# Patient Record
Sex: FEMALE | Race: WHITE | NOT HISPANIC OR LATINO
[De-identification: names, ages, dates, MRNs, and addresses within clinical notes are randomized per-mention and may not be internally consistent; named-entity substitution may affect disease eponyms.]

---

## 2019-10-16 ENCOUNTER — APPOINTMENT (OUTPATIENT)
Dept: ENDOCRINOLOGY | Facility: CLINIC | Age: 64
End: 2019-10-16
Payer: COMMERCIAL

## 2019-10-16 VITALS
WEIGHT: 161 LBS | HEART RATE: 56 BPM | BODY MASS INDEX: 26.82 KG/M2 | OXYGEN SATURATION: 98 % | SYSTOLIC BLOOD PRESSURE: 110 MMHG | HEIGHT: 65 IN | DIASTOLIC BLOOD PRESSURE: 70 MMHG

## 2019-10-16 DIAGNOSIS — Z80.0 FAMILY HISTORY OF MALIGNANT NEOPLASM OF DIGESTIVE ORGANS: ICD-10-CM

## 2019-10-16 DIAGNOSIS — Z85.3 PERSONAL HISTORY OF MALIGNANT NEOPLASM OF BREAST: ICD-10-CM

## 2019-10-16 DIAGNOSIS — Z23 ENCOUNTER FOR IMMUNIZATION: ICD-10-CM

## 2019-10-16 DIAGNOSIS — Z80.8 FAMILY HISTORY OF MALIGNANT NEOPLASM OF OTHER ORGANS OR SYSTEMS: ICD-10-CM

## 2019-10-16 DIAGNOSIS — Z86.59 PERSONAL HISTORY OF OTHER MENTAL AND BEHAVIORAL DISORDERS: ICD-10-CM

## 2019-10-16 DIAGNOSIS — Z86.79 PERSONAL HISTORY OF OTHER DISEASES OF THE CIRCULATORY SYSTEM: ICD-10-CM

## 2019-10-16 PROCEDURE — 90674 CCIIV4 VAC NO PRSV 0.5 ML IM: CPT

## 2019-10-16 PROCEDURE — G0008: CPT

## 2019-10-16 PROCEDURE — 99245 OFF/OP CONSLTJ NEW/EST HI 55: CPT | Mod: 25

## 2019-10-16 RX ORDER — SERTRALINE HYDROCHLORIDE 50 MG/1
50 TABLET, FILM COATED ORAL
Refills: 0 | Status: ACTIVE | COMMUNITY

## 2019-10-16 RX ORDER — LISINOPRIL AND HYDROCHLOROTHIAZIDE TABLETS 20; 12.5 MG/1; MG/1
20-12.5 TABLET ORAL
Refills: 0 | Status: ACTIVE | COMMUNITY

## 2019-10-16 RX ORDER — METOPROLOL SUCCINATE 25 MG/1
25 TABLET, EXTENDED RELEASE ORAL
Refills: 0 | Status: ACTIVE | COMMUNITY

## 2019-10-18 LAB
25(OH)D3 SERPL-MCNC: 15.6 NG/ML
ACTH SER-ACNC: 9.6 PG/ML
ALBUMIN SERPL ELPH-MCNC: 4.8 G/DL
ALDOSTERONE SERUM: 15.9 NG/DL
ALP BLD-CCNC: 112 U/L
ALT SERPL-CCNC: 9 U/L
ANION GAP SERPL CALC-SCNC: 15 MMOL/L
AST SERPL-CCNC: 12 U/L
BASOPHILS # BLD AUTO: 0.05 K/UL
BASOPHILS NFR BLD AUTO: 0.5 %
BILIRUB SERPL-MCNC: 0.4 MG/DL
BUN SERPL-MCNC: 22 MG/DL
CALCIUM SERPL-MCNC: 10.2 MG/DL
CALCIUM SERPL-MCNC: 10.2 MG/DL
CHLORIDE SERPL-SCNC: 102 MMOL/L
CHOLEST SERPL-MCNC: 149 MG/DL
CHOLEST/HDLC SERPL: 2.9 RATIO
CO2 SERPL-SCNC: 23 MMOL/L
CORTIS SERPL-MCNC: 6.1 UG/DL
CREAT SERPL-MCNC: 0.67 MG/DL
EOSINOPHIL # BLD AUTO: 0.09 K/UL
EOSINOPHIL NFR BLD AUTO: 1 %
ESTIMATED AVERAGE GLUCOSE: 103 MG/DL
GLUCOSE SERPL-MCNC: 92 MG/DL
HBA1C MFR BLD HPLC: 5.2 %
HCT VFR BLD CALC: 38 %
HDLC SERPL-MCNC: 52 MG/DL
HGB BLD-MCNC: 11.8 G/DL
IMM GRANULOCYTES NFR BLD AUTO: 0.5 %
LDLC SERPL CALC-MCNC: 71 MG/DL
LYMPHOCYTES # BLD AUTO: 2.12 K/UL
LYMPHOCYTES NFR BLD AUTO: 22.5 %
MAGNESIUM SERPL-MCNC: 2.1 MG/DL
MAN DIFF?: NORMAL
MCHC RBC-ENTMCNC: 27.8 PG
MCHC RBC-ENTMCNC: 31.1 GM/DL
MCV RBC AUTO: 89.4 FL
MONOCYTES # BLD AUTO: 0.52 K/UL
MONOCYTES NFR BLD AUTO: 5.5 %
NEUTROPHILS # BLD AUTO: 6.58 K/UL
NEUTROPHILS NFR BLD AUTO: 70 %
PARATHYROID HORMONE INTACT: 40 PG/ML
PHOSPHATE SERPL-MCNC: 4 MG/DL
PLATELET # BLD AUTO: 296 K/UL
POTASSIUM SERPL-SCNC: 4.2 MMOL/L
PROT SERPL-MCNC: 7.1 G/DL
RBC # BLD: 4.25 M/UL
RBC # FLD: 13.3 %
SODIUM SERPL-SCNC: 140 MMOL/L
T4 FREE SERPL-MCNC: 1 NG/DL
TRIGL SERPL-MCNC: 128 MG/DL
TSH SERPL-ACNC: 3.12 UIU/ML
WBC # FLD AUTO: 9.41 K/UL

## 2019-10-23 LAB
ANDROST SERPL-MCNC: 24 NG/DL
METANEPHRINE, PL: 43 PG/ML
NORMETANEPHRINE, PL: 69 PG/ML
RENIN ACTIVITY, PLASMA: 0.31 NG/ML/HR
TESTOST BND SERPL-MCNC: 0.06 NG/DL
TESTOSTERONE BIOAVAILABLE: 0.6 NG/DL
TESTOSTERONE TOTAL S: 8 NG/DL

## 2019-10-24 LAB — DEXAMETHASONE LEVEL: NORMAL

## 2020-03-31 ENCOUNTER — RX RENEWAL (OUTPATIENT)
Age: 65
End: 2020-03-31

## 2020-03-31 RX ORDER — ERGOCALCIFEROL 1.25 MG/1
1.25 MG CAPSULE, LIQUID FILLED ORAL
Qty: 12 | Refills: 1 | Status: ACTIVE | COMMUNITY
Start: 2019-10-18 | End: 1900-01-01

## 2020-09-17 ENCOUNTER — RX RENEWAL (OUTPATIENT)
Age: 65
End: 2020-09-17

## 2020-10-14 ENCOUNTER — APPOINTMENT (OUTPATIENT)
Dept: ENDOCRINOLOGY | Facility: CLINIC | Age: 65
End: 2020-10-14
Payer: COMMERCIAL

## 2020-10-14 PROCEDURE — 99442: CPT

## 2021-10-06 ENCOUNTER — APPOINTMENT (OUTPATIENT)
Dept: ENDOCRINOLOGY | Facility: CLINIC | Age: 66
End: 2021-10-06

## 2021-10-06 ENCOUNTER — APPOINTMENT (OUTPATIENT)
Dept: ENDOCRINOLOGY | Facility: CLINIC | Age: 66
End: 2021-10-06
Payer: MEDICARE

## 2021-10-06 PROCEDURE — 99214 OFFICE O/P EST MOD 30 MIN: CPT | Mod: 95

## 2021-10-06 NOTE — PHYSICAL EXAM
[Alert] : alert [Well Nourished] : well nourished [Healthy Appearance] : healthy appearance [Normal Sclera/Conjunctiva] : normal sclera/conjunctiva [EOMI] : extra ocular movement intact [Normal Outer Ear/Nose] : the ears and nose were normal in appearance [Normal Hearing] : hearing was normal [No Respiratory Distress] : no respiratory distress [No Accessory Muscle Use] : no accessory muscle use [No Stigmata of Cushings Syndrome] : no stigmata of Cushings Syndrome [No Rash] : no rash [No Motor Deficits] : the motor exam was normal [No Tremors] : no tremors [Oriented x3] : oriented to person, place, and time [Normal Affect] : the affect was normal [Normal Mood] : the mood was normal

## 2021-10-06 NOTE — ASSESSMENT
[FreeTextEntry1] : 66F presents for consultation regarding recently discovered 5cm adrenal incidentaloma during imaging for colitis.\par \par 1) L adrenal adenoma\par Fat based mass consistent with benign myelolipoma\par stable is size from prior imaging\par Workup for adrenal hypersecretion unrevealing\par asymptomatic\par plan to repeat CT adrenal protocol June 2022\par \par 2) HTN - currently well controlled on 3  agents. HR mildly low, on beta blocker. Given suspicious for pheo is low can continue on beta blocker at this time.\par 3) Osteopenia - due for DXA will reorder\par counselled on 3 servings calcium daily, can include calcium supplement if not taking in enough in diet\par adherence to vit D supplement reviewed D3 1000 units daily\par 4) B12 deficiency\par resume use of OTC supplement\par repeat labs B12 and 25OHD with PCP\par \par Follow up June 2022 (RACHEAL ivan)

## 2021-10-06 NOTE — HISTORY OF PRESENT ILLNESS
[FreeTextEntry1] : 66F presents for telehealth follow up regarding L adrenal mass, osteopenia, HTN.\par \par Had CT adrenal protocol June 2020 (unable to tolerate MRI)\par L adrenal fat based mass consistent with benign myelolipoma around 5cm, stable from prior imaging.\par Weight stable, no new symptoms other than insomnia which she attributes to stress and [facility]/change in schedule.\par \par History of osteopenia, last DXA 10/2019\par Calcium intake - eats yogurt daily, consumes about 2-3 servings of dairy per day. Not taking calcium supplement. \par \par Prior scans:\par \par CT A/P WO cont 8/2018:\par fat attenuation mass in the L adrenal gland 5.1 x 4.9 x 4.4 cm, slightly larger than on prior (7/2017) consistent with myolipoma.\par \par CT A/P with IV contrast 9/13/19:\par 5cm heterogenous fat attenuation mass left adrenal. Likely adrenal adenoma.\par Also 8mm low attenutation lesion on spleen, likely cyst. And findings c/w colitis

## 2021-10-06 NOTE — REVIEW OF SYSTEMS
[Recent Weight Gain (___ Lbs)] : no recent weight gain [Recent Weight Loss (___ Lbs)] : no recent weight loss [Negative] : Heme/Lymph

## 2021-10-06 NOTE — REASON FOR VISIT
[Home] : at home, [unfilled] , at the time of the visit. [Medical Office: ([facility])___] : at the medical office located in  [Verbal consent obtained from patient] : the patient, [unfilled] [Follow - Up] : a follow-up visit [Adrenal Evaluation/Adrenal Disorder] : adrenal evaluation/adrenal disorder

## 2021-10-22 RX ORDER — DEXAMETHASONE 1 MG/1
1 TABLET ORAL
Qty: 1 | Refills: 0 | Status: DISCONTINUED | COMMUNITY
Start: 2019-10-16 | End: 2021-10-22

## 2022-06-15 ENCOUNTER — APPOINTMENT (OUTPATIENT)
Dept: ENDOCRINOLOGY | Facility: CLINIC | Age: 67
End: 2022-06-15
Payer: MEDICARE

## 2022-06-15 PROCEDURE — 99214 OFFICE O/P EST MOD 30 MIN: CPT | Mod: 95

## 2022-06-15 NOTE — REASON FOR VISIT
[Follow - Up] : a follow-up visit [Adrenal Evaluation/Adrenal Disorder] : adrenal evaluation/adrenal disorder [Home] : at home, [unfilled] , at the time of the visit. [Medical Office: ([facility])___] : at the medical office located in  [Verbal consent obtained from patient] : the patient, [unfilled]

## 2022-06-15 NOTE — HISTORY OF PRESENT ILLNESS
[FreeTextEntry1] : 67F presents for telehealth follow up regarding L adrenal mass, osteopenia, HTN.\par \par Had CT adrenal protocol June 2020 (unable to tolerate MRI)\par L adrenal fat based mass consistent with benign myelolipoma around 5cm, stable from prior imaging.\par Weight stable, no new symptoms other than insomnia which she attributes to stress and snf/change in schedule.\par \par History of osteopenia on DXA 10/2019\par Follow up DXA done 10/21:\par L Spine T score - 1.2\par Fem neck -1.9\par Hip -1.4\par \par Calcium intake - eats yogurt daily, consumes about 2-3 servings of dairy per day. Not taking calcium supplement. Not consistently taking vitamin D. No fractures. \par \par Prior scans:\par \par CT A/P WO cont 8/2018:\par fat attenuation mass in the L adrenal gland 5.1 x 4.9 x 4.4 cm, slightly larger than on prior (7/2017) consistent with myolipoma.\par \par CT A/P with IV contrast 9/13/19:\par 5cm heterogenous fat attenuation mass left adrenal. Likely adrenal adenoma.\par Also 8mm low attenutation lesion on spleen, likely cyst. And findings c/w colitis\par \par HTN no change to meds, not routinely checking BP at home, no recent doctor appointments.

## 2022-06-15 NOTE — ASSESSMENT
[FreeTextEntry1] : 67F presents for follow up regarding 5cm adrenal incidentaloma, osteopenia, HTN.\par \par 1) L adrenal adenoma\par Fat based mass consistent with benign myelolipoma\par stable is size from prior imaging\par Workup for adrenal hypersecretion unrevealing\par asymptomatic\par plan to repeat CT adrenal protocol June 2022, will order. Did not tolerate MRI.\par \par 2) HTN - remains on 3  agents. Has not checked BP recently, recommend periodic home monitoring.\par 3) Osteopenia - Next DXA due 10/2023\par counselled on 3 servings calcium daily, can include calcium supplement if not taking in enough in diet\par adherence to vit D supplement reviewed D3 1000 units daily\par 4) B12 deficiency\par resume use of OTC supplement\par repeat labs B12 and 25OHD \par \par Follow up one year, RACHEAL ivan

## 2022-07-07 ENCOUNTER — NON-APPOINTMENT (OUTPATIENT)
Age: 67
End: 2022-07-07

## 2022-07-14 ENCOUNTER — APPOINTMENT (OUTPATIENT)
Dept: UROLOGY | Facility: CLINIC | Age: 67
End: 2022-07-14

## 2022-07-14 VITALS
TEMPERATURE: 98.9 F | BODY MASS INDEX: 27.16 KG/M2 | HEIGHT: 65 IN | SYSTOLIC BLOOD PRESSURE: 130 MMHG | HEART RATE: 60 BPM | RESPIRATION RATE: 17 BRPM | DIASTOLIC BLOOD PRESSURE: 75 MMHG | WEIGHT: 163 LBS

## 2022-07-14 DIAGNOSIS — Z86.2 PERSONAL HISTORY OF DISEASES OF THE BLOOD AND BLOOD-FORMING ORGANS AND CERTAIN DISORDERS INVOLVING THE IMMUNE MECHANISM: ICD-10-CM

## 2022-07-14 PROCEDURE — 99215 OFFICE O/P EST HI 40 MIN: CPT

## 2022-07-14 NOTE — PHYSICAL EXAM
[General Appearance - Well Developed] : well developed [Normal Appearance] : normal appearance [Edema] : no peripheral edema [] : no respiratory distress [Exaggerated Use Of Accessory Muscles For Inspiration] : no accessory muscle use [Abdomen Soft] : soft [Costovertebral Angle Tenderness] : no ~M costovertebral angle tenderness [Normal Station and Gait] : the gait and station were normal for the patient's age [Skin Lesions] : no skin lesions [No Focal Deficits] : no focal deficits [Oriented To Time, Place, And Person] : oriented to person, place, and time

## 2022-07-14 NOTE — ASSESSMENT
[FreeTextEntry1] : 67F with asymptomatic adrenal myelolipoma, enlarging on recent imaging.\par \par - Discussed IR embolization of the mass \par \par - Message sent to Dr Bajwa to schedule for consultation\par   I spent over 60 -minutes time today on all issues related to this encounter on today date of service including non face to face time.\par .\par

## 2022-07-14 NOTE — HISTORY OF PRESENT ILLNESS
[None] : no symptoms [FreeTextEntry1] : 67F with history of known left adrenal myelolipoma, known since 2009, slowly growing since then.\par Followed by endocrinology, negative hormonal workup.\par Referred for recent enlargement.\par \par Fatigue recently, past 2-3 weeks, busy with son's wedding.\par History of micro hematuria ? 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

## 2022-07-27 ENCOUNTER — APPOINTMENT (OUTPATIENT)
Dept: INTERVENTIONAL RADIOLOGY/VASCULAR | Facility: CLINIC | Age: 67
End: 2022-07-27

## 2022-08-10 ENCOUNTER — APPOINTMENT (OUTPATIENT)
Dept: INTERVENTIONAL RADIOLOGY/VASCULAR | Facility: CLINIC | Age: 67
End: 2022-08-10

## 2022-08-10 VITALS
WEIGHT: 161 LBS | HEIGHT: 65 IN | BODY MASS INDEX: 26.82 KG/M2 | OXYGEN SATURATION: 98 % | SYSTOLIC BLOOD PRESSURE: 131 MMHG | DIASTOLIC BLOOD PRESSURE: 71 MMHG | RESPIRATION RATE: 17 BRPM | HEART RATE: 51 BPM

## 2022-08-10 DIAGNOSIS — D69.1 QUALITATIVE PLATELET DEFECTS: ICD-10-CM

## 2022-08-10 PROCEDURE — 99204 OFFICE O/P NEW MOD 45 MIN: CPT

## 2022-08-10 RX ORDER — NORMAL SALT TABLETS 1 G/G
1 TABLET ORAL
Qty: 18 | Refills: 0 | Status: DISCONTINUED | COMMUNITY
Start: 2019-10-24 | End: 2022-08-10

## 2022-08-10 NOTE — HISTORY OF PRESENT ILLNESS
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 Kim - [place] [phone]). \par Left Adrenal myelolipoma (Dx 2009) which has increased in size since last CT scan (doesn't tolerate MRI). \par \par Patient is now being referred by Dr. Burciaga for consultation to discuss embolization of left adrenal myelolipoma.\par \par \par Denies any recent SOB, CP, fever, chills, n/v/d. \par \par Patient sates she has been feeling well overall. Appetite has been good no unintentional weight loss.\par \par Patient aware to get hematology clearance prior to procedure with recommendation for DDAVP infusion. \par \par Patient to be evaluated by endocrinologist as well prior to embolization.

## 2022-08-10 NOTE — CONSULT LETTER
[Dear  ___] : Dear  [unfilled], [Consult Letter:] : I had the pleasure of evaluating your patient, [unfilled]. [Please see my note below.] : Please see my note below. [Consult Closing:] : Thank you very much for allowing me to participate in the care of this patient.  If you have any questions, please do not hesitate to contact me. [Sincerely,] : Sincerely, [FreeTextEntry2] : Dr. Jeremias Burciaga

## 2022-08-10 NOTE — ASSESSMENT
[FreeTextEntry1] : 67-year-old female with known left adrenal myelolipoma, known since 2009, progressively increasing in size since then.  CT dated 7/5/2022 demonstrated 6 x 5.4 x 6.3 cm left adrenal mass consistent with myelolipoma.  The mass was measuring 5.5 x 4.7 x 5.6 cm on January 23, 2020 and 2.6 x 2.3 cm in 2009.\par \par Comorbidities:\par Coxsackie virus complicated with pericardial effusion in 2012.  Hypertension, osteopenia.  Breast cancer in 2009 status post bilateral mastectomies followed with radiation therapy.  Status post hysterectomy in 1986 during which patient had a massive hemorrhage.  Hepatitis C following massive blood transfusions.  Patient was diagnosed with qualitative platelet disorder which requires DDAVP transfusion before any invasive procedures.  Patient's hematologist is  from [facility].\par \par Assessment:\par Considering progressive enlargement of the left adrenal myelolipoma reaching over 6 cm in size, patient's blood disorder making her increased risk for massive hemorrhage due to tumor rupture, patient is an appropriate candidate for left adrenal myelo lipoma embolization in order to avoid possible life-threatening hemorrhage.\par \par Procedure, its risks, benefits and alternatives were discussed with the patient, her  and informed consent was obtained.\par \par Plan:\par 1.  Schedule left adrenal angiogram and embolization.\par 2.  Vascular access likely right common femoral artery.\par 3.  Perform CT angiogram to complete evaluation of the left adrenal mass and create possible vascular mapping.\par 4.  Hematology consult for perioperative management.\par 5.  Endocrinology consult regarding perioperative management.\par 6.  Patient will be admitted to the hospital postprocedure for possible postembolization syndrome management on Dr. Burciaga's service.

## 2022-08-10 NOTE — PHYSICAL EXAM
[Alert] : alert [No Respiratory Distress] : no respiratory distress [No Accessory Muscle Use] : no accessory muscle use [Clear to Auscultation] : lungs were clear to auscultation bilaterally [Normal Rate] : heart rate was normal  [Oriented x3] : oriented to person, place, and time [de-identified] : b/l femoral pulses palpable and b/l DP's palpable as well. Barbeau test Right Type: A Left Type:A [de-identified] : b/l groin C/D/I no erythema or s/s of infection noted.

## 2022-08-16 ENCOUNTER — APPOINTMENT (OUTPATIENT)
Dept: CT IMAGING | Facility: IMAGING CENTER | Age: 67
End: 2022-08-16

## 2022-08-16 ENCOUNTER — OUTPATIENT (OUTPATIENT)
Dept: OUTPATIENT SERVICES | Facility: HOSPITAL | Age: 67
LOS: 1 days | End: 2022-08-16

## 2022-08-16 VITALS
TEMPERATURE: 98 F | OXYGEN SATURATION: 98 % | WEIGHT: 158.07 LBS | HEIGHT: 65 IN | SYSTOLIC BLOOD PRESSURE: 136 MMHG | RESPIRATION RATE: 16 BRPM | DIASTOLIC BLOOD PRESSURE: 70 MMHG | HEART RATE: 80 BPM

## 2022-08-16 DIAGNOSIS — Z90.710 ACQUIRED ABSENCE OF BOTH CERVIX AND UTERUS: Chronic | ICD-10-CM

## 2022-08-16 DIAGNOSIS — Z98.891 HISTORY OF UTERINE SCAR FROM PREVIOUS SURGERY: Chronic | ICD-10-CM

## 2022-08-16 DIAGNOSIS — C50.919 MALIGNANT NEOPLASM OF UNSPECIFIED SITE OF UNSPECIFIED FEMALE BREAST: Chronic | ICD-10-CM

## 2022-08-16 DIAGNOSIS — D69.1 QUALITATIVE PLATELET DEFECTS: ICD-10-CM

## 2022-08-16 DIAGNOSIS — N32.9 BLADDER DISORDER, UNSPECIFIED: Chronic | ICD-10-CM

## 2022-08-16 DIAGNOSIS — D35.02 BENIGN NEOPLASM OF LEFT ADRENAL GLAND: ICD-10-CM

## 2022-08-16 DIAGNOSIS — D17.79 BENIGN LIPOMATOUS NEOPLASM OF OTHER SITES: ICD-10-CM

## 2022-08-16 DIAGNOSIS — I10 ESSENTIAL (PRIMARY) HYPERTENSION: ICD-10-CM

## 2022-08-16 LAB
ALBUMIN SERPL ELPH-MCNC: 4.9 G/DL — SIGNIFICANT CHANGE UP (ref 3.3–5)
ALP SERPL-CCNC: 116 U/L — SIGNIFICANT CHANGE UP (ref 40–120)
ALT FLD-CCNC: 11 U/L — SIGNIFICANT CHANGE UP (ref 4–33)
ANION GAP SERPL CALC-SCNC: 12 MMOL/L — SIGNIFICANT CHANGE UP (ref 7–14)
APTT BLD: 35 SEC — SIGNIFICANT CHANGE UP (ref 27–36.3)
AST SERPL-CCNC: 17 U/L — SIGNIFICANT CHANGE UP (ref 4–32)
BILIRUB SERPL-MCNC: 0.5 MG/DL — SIGNIFICANT CHANGE UP (ref 0.2–1.2)
BLD GP AB SCN SERPL QL: NEGATIVE — SIGNIFICANT CHANGE UP
BUN SERPL-MCNC: 16 MG/DL — SIGNIFICANT CHANGE UP (ref 7–23)
CALCIUM SERPL-MCNC: 9.8 MG/DL — SIGNIFICANT CHANGE UP (ref 8.4–10.5)
CHLORIDE SERPL-SCNC: 101 MMOL/L — SIGNIFICANT CHANGE UP (ref 98–107)
CO2 SERPL-SCNC: 27 MMOL/L — SIGNIFICANT CHANGE UP (ref 22–31)
CREAT SERPL-MCNC: 0.59 MG/DL — SIGNIFICANT CHANGE UP (ref 0.5–1.3)
EGFR: 99 ML/MIN/1.73M2 — SIGNIFICANT CHANGE UP
GLUCOSE SERPL-MCNC: 75 MG/DL — SIGNIFICANT CHANGE UP (ref 70–99)
HCT VFR BLD CALC: 37.2 % — SIGNIFICANT CHANGE UP (ref 34.5–45)
HGB BLD-MCNC: 11.9 G/DL — SIGNIFICANT CHANGE UP (ref 11.5–15.5)
INR BLD: 0.98 RATIO — SIGNIFICANT CHANGE UP (ref 0.88–1.16)
MCHC RBC-ENTMCNC: 27.5 PG — SIGNIFICANT CHANGE UP (ref 27–34)
MCHC RBC-ENTMCNC: 32 GM/DL — SIGNIFICANT CHANGE UP (ref 32–36)
MCV RBC AUTO: 86.1 FL — SIGNIFICANT CHANGE UP (ref 80–100)
NRBC # BLD: 0 /100 WBCS — SIGNIFICANT CHANGE UP (ref 0–0)
NRBC # FLD: 0 K/UL — SIGNIFICANT CHANGE UP (ref 0–0)
PLATELET # BLD AUTO: 262 K/UL — SIGNIFICANT CHANGE UP (ref 150–400)
POTASSIUM SERPL-MCNC: 3.6 MMOL/L — SIGNIFICANT CHANGE UP (ref 3.5–5.3)
POTASSIUM SERPL-SCNC: 3.6 MMOL/L — SIGNIFICANT CHANGE UP (ref 3.5–5.3)
PROT SERPL-MCNC: 7.4 G/DL — SIGNIFICANT CHANGE UP (ref 6–8.3)
PROTHROM AB SERPL-ACNC: 11.4 SEC — SIGNIFICANT CHANGE UP (ref 10.5–13.4)
RBC # BLD: 4.32 M/UL — SIGNIFICANT CHANGE UP (ref 3.8–5.2)
RBC # FLD: 13.3 % — SIGNIFICANT CHANGE UP (ref 10.3–14.5)
RH IG SCN BLD-IMP: POSITIVE — SIGNIFICANT CHANGE UP
SODIUM SERPL-SCNC: 140 MMOL/L — SIGNIFICANT CHANGE UP (ref 135–145)
WBC # BLD: 8.67 K/UL — SIGNIFICANT CHANGE UP (ref 3.8–10.5)
WBC # FLD AUTO: 8.67 K/UL — SIGNIFICANT CHANGE UP (ref 3.8–10.5)

## 2022-08-16 PROCEDURE — 93010 ELECTROCARDIOGRAM REPORT: CPT

## 2022-08-16 NOTE — H&P PST ADULT - LAB RESULTS AND INTERPRETATION
cbc, cmp, PT/PTT/INR , T&S cbc, cmp, PT/PTT/INR ,   T&S done for hx of hemorrhage in past r/t platelet disorder

## 2022-08-16 NOTE — H&P PST ADULT - NSANTHOSAYNRD_GEN_A_CORE
No. NAMAN screening performed.  STOP BANG Legend: 0-2 = LOW Risk; 3-4 = INTERMEDIATE Risk; 5-8 = HIGH Risk

## 2022-08-16 NOTE — H&P PST ADULT - NSICDXPASTMEDICALHX_GEN_ALL_CORE_FT
PAST MEDICAL HISTORY:  Adrenal myelolipoma     Anxiety     Breast cancer left and right breast - NO IV/BP left arm    History of hepatitis C     HTN (hypertension)     Qualitative platelet disorder      PAST MEDICAL HISTORY:  Adrenal myelolipoma     Anxiety     Breast cancer left and right breast - NO IV/BP left arm    History of hepatitis C     HTN (hypertension)     Obesity     Qualitative platelet disorder

## 2022-08-16 NOTE — H&P PST ADULT - PROBLEM SELECTOR PLAN 1
Pt scheduled for surgery and preop instructions including instructions for taking Famotidine and for Chlorhexidine use in showering on the day of surgery, given verbally and with use of  written materials, and patient confirming understanding of such instructions using  teach back method.  Email to surgeon confirming Hematology clearance and need for preop infusion instructions  Request Echo, comp EKG and LVN from Cardio - reviewed with Dr Latif

## 2022-08-16 NOTE — H&P PST ADULT - GENITOURINARY COMMENTS
not examined hx Left Adrenal Myelolipoma - followed since 2009 and now increased in size - pt denies flank pain w/u done by Endo

## 2022-08-16 NOTE — H&P PST ADULT - PROBLEM SELECTOR PLAN 3
Pt to get Hematology clearance - has seen recently - and will confirm with surgeon preop DDAVP infusion instructions

## 2022-08-16 NOTE — H&P PST ADULT - HISTORY OF PRESENT ILLNESS
Pt is a   Pt given information for COVID PCR testing sites and told to make appointment 3-5 days preop  Pt is a 67 yr old female scheduled for Adrenal Myelolipoma Embolization with Dr Bajwa tentatively 8/29/22 - pt has hx of adrenal myelolipoma followed since 2009 and noted to have increased in size recently - pt hx qualitative platelet disorder with hemorrhage requiring 26u PC 1986 following surgery - pt seen by Hematology who will discuss preop instructions for DDAVP infusion with surgeon - pt hx Hep C treated yrs ago and Breast ca with b/l lumpectomies and RT - No IV/BP left arm - pt followed by Endo - ( Dr Burkett is daughter in law)   Pt given information for COVID PCR testing sites and told to make appointment 3-5 days preop

## 2022-08-16 NOTE — H&P PST ADULT - HEMATOLOGY/LYMPHATICS COMMENTS
hx qualitative platelet disorder - followed by Hematology - to speak to surgeon and arrange preop infusion - Hx Hep C - treated yrs ago

## 2022-08-16 NOTE — H&P PST ADULT - NSICDXPASTSURGICALHX_GEN_ALL_CORE_FT
PAST SURGICAL HISTORY:  Bladder disorder placenta attached    Breast cancer     H/O:      H/O: hysterectomy pregnancy related - hemorrhage     PAST SURGICAL HISTORY:  Bladder disorder placenta attached - surgery to remove    H/O:      H/O: hysterectomy pregnancy related - hemorrhage -

## 2022-08-16 NOTE — H&P PST ADULT - ENDOCRINE
Caller: Kailyn Eng    details…

## 2022-08-16 NOTE — H&P PST ADULT - ENERGY EXPENDITURE (METS)
----- Message from MOHAMUD Zheng sent at 3/18/2020  9:12 AM CDT -----  Notify patient-has upcoming appointment  With Dr. Berger.  CBC      Marginal outliers, stable discuss at f/u            Lipid panel  Elevated, LDL as noted, discuss at f/u  CMP  Stable with continued abnormal creatinine, check bmp in 6 months, discuss at f/u  Vitamin D level  Insufficient, take 800 IU Vitamin D3 daily OTC any brand and repeat level in 3-6 months, discuss at f/u  TSH  Normal  Schedule visit according to organizational recommendations.   >4

## 2022-08-25 ENCOUNTER — NON-APPOINTMENT (OUTPATIENT)
Age: 67
End: 2022-08-25

## 2022-08-25 PROBLEM — I10 ESSENTIAL (PRIMARY) HYPERTENSION: Chronic | Status: ACTIVE | Noted: 2022-08-16

## 2022-08-25 PROBLEM — D17.79 BENIGN LIPOMATOUS NEOPLASM OF OTHER SITES: Chronic | Status: ACTIVE | Noted: 2022-08-16

## 2022-08-25 PROBLEM — D69.1 QUALITATIVE PLATELET DEFECTS: Chronic | Status: ACTIVE | Noted: 2022-08-16

## 2022-08-25 PROBLEM — Z86.19 PERSONAL HISTORY OF OTHER INFECTIOUS AND PARASITIC DISEASES: Chronic | Status: ACTIVE | Noted: 2022-08-16

## 2022-08-25 PROBLEM — E66.9 OBESITY, UNSPECIFIED: Chronic | Status: ACTIVE | Noted: 2022-08-16

## 2022-08-25 PROBLEM — F41.9 ANXIETY DISORDER, UNSPECIFIED: Chronic | Status: ACTIVE | Noted: 2022-08-16

## 2022-08-25 PROBLEM — C50.919 MALIGNANT NEOPLASM OF UNSPECIFIED SITE OF UNSPECIFIED FEMALE BREAST: Chronic | Status: ACTIVE | Noted: 2022-08-16

## 2022-08-29 ENCOUNTER — OUTPATIENT (OUTPATIENT)
Dept: OUTPATIENT SERVICES | Facility: HOSPITAL | Age: 67
LOS: 1 days | End: 2022-08-29

## 2022-08-29 ENCOUNTER — INPATIENT (INPATIENT)
Facility: HOSPITAL | Age: 67
LOS: 0 days | Discharge: HOME | End: 2022-08-30
Attending: STUDENT IN AN ORGANIZED HEALTH CARE EDUCATION/TRAINING PROGRAM | Admitting: STUDENT IN AN ORGANIZED HEALTH CARE EDUCATION/TRAINING PROGRAM

## 2022-08-29 VITALS
SYSTOLIC BLOOD PRESSURE: 130 MMHG | HEART RATE: 51 BPM | RESPIRATION RATE: 14 BRPM | OXYGEN SATURATION: 94 % | TEMPERATURE: 98 F | DIASTOLIC BLOOD PRESSURE: 60 MMHG

## 2022-08-29 DIAGNOSIS — N32.9 BLADDER DISORDER, UNSPECIFIED: Chronic | ICD-10-CM

## 2022-08-29 DIAGNOSIS — M85.80 OTHER SPECIFIED DISORDERS OF BONE DENSITY AND STRUCTURE, UNSPECIFIED SITE: ICD-10-CM

## 2022-08-29 DIAGNOSIS — D35.02 BENIGN NEOPLASM OF LEFT ADRENAL GLAND: ICD-10-CM

## 2022-08-29 DIAGNOSIS — Z98.891 HISTORY OF UTERINE SCAR FROM PREVIOUS SURGERY: Chronic | ICD-10-CM

## 2022-08-29 DIAGNOSIS — I10 ESSENTIAL (PRIMARY) HYPERTENSION: ICD-10-CM

## 2022-08-29 DIAGNOSIS — Z90.710 ACQUIRED ABSENCE OF BOTH CERVIX AND UTERUS: Chronic | ICD-10-CM

## 2022-08-29 DIAGNOSIS — D17.79 BENIGN LIPOMATOUS NEOPLASM OF OTHER SITES: ICD-10-CM

## 2022-08-29 PROCEDURE — 99222 1ST HOSP IP/OBS MODERATE 55: CPT | Mod: GC

## 2022-08-29 PROCEDURE — 37243 VASC EMBOLIZE/OCCLUDE ORGAN: CPT

## 2022-08-29 PROCEDURE — 36245 INS CATH ABD/L-EXT ART 1ST: CPT

## 2022-08-29 PROCEDURE — 99231 SBSQ HOSP IP/OBS SF/LOW 25: CPT

## 2022-08-29 PROCEDURE — 76937 US GUIDE VASCULAR ACCESS: CPT | Mod: 26

## 2022-08-29 RX ORDER — CHOLECALCIFEROL (VITAMIN D3) 125 MCG
1000 CAPSULE ORAL DAILY
Refills: 0 | Status: DISCONTINUED | OUTPATIENT
Start: 2022-08-29 | End: 2022-08-30

## 2022-08-29 RX ORDER — METOPROLOL TARTRATE 50 MG
50 TABLET ORAL
Refills: 0 | Status: DISCONTINUED | OUTPATIENT
Start: 2022-08-29 | End: 2022-08-30

## 2022-08-29 RX ORDER — HYDROMORPHONE HYDROCHLORIDE 2 MG/ML
0.5 INJECTION INTRAMUSCULAR; INTRAVENOUS; SUBCUTANEOUS
Refills: 0 | Status: DISCONTINUED | OUTPATIENT
Start: 2022-08-29 | End: 2022-08-30

## 2022-08-29 RX ORDER — HYDROMORPHONE HYDROCHLORIDE 2 MG/ML
30 INJECTION INTRAMUSCULAR; INTRAVENOUS; SUBCUTANEOUS
Refills: 0 | Status: DISCONTINUED | OUTPATIENT
Start: 2022-08-29 | End: 2022-08-30

## 2022-08-29 RX ORDER — LANOLIN ALCOHOL/MO/W.PET/CERES
3 CREAM (GRAM) TOPICAL ONCE
Refills: 0 | Status: COMPLETED | OUTPATIENT
Start: 2022-08-29 | End: 2022-08-29

## 2022-08-29 RX ORDER — NALOXONE HYDROCHLORIDE 4 MG/.1ML
0.1 SPRAY NASAL
Refills: 0 | Status: DISCONTINUED | OUTPATIENT
Start: 2022-08-29 | End: 2022-08-30

## 2022-08-29 RX ORDER — DIPHENHYDRAMINE HCL 50 MG
25 CAPSULE ORAL EVERY 4 HOURS
Refills: 0 | Status: DISCONTINUED | OUTPATIENT
Start: 2022-08-29 | End: 2022-08-30

## 2022-08-29 RX ORDER — SODIUM CHLORIDE 9 MG/ML
1000 INJECTION INTRAMUSCULAR; INTRAVENOUS; SUBCUTANEOUS
Refills: 0 | Status: DISCONTINUED | OUTPATIENT
Start: 2022-08-29 | End: 2022-08-30

## 2022-08-29 RX ORDER — ONDANSETRON 8 MG/1
4 TABLET, FILM COATED ORAL EVERY 6 HOURS
Refills: 0 | Status: DISCONTINUED | OUTPATIENT
Start: 2022-08-29 | End: 2022-08-30

## 2022-08-29 RX ADMIN — Medication 3 MILLIGRAM(S): at 21:56

## 2022-08-29 RX ADMIN — SODIUM CHLORIDE 75 MILLILITER(S): 9 INJECTION INTRAMUSCULAR; INTRAVENOUS; SUBCUTANEOUS at 22:00

## 2022-08-29 RX ADMIN — HYDROMORPHONE HYDROCHLORIDE 30 MILLILITER(S): 2 INJECTION INTRAMUSCULAR; INTRAVENOUS; SUBCUTANEOUS at 16:52

## 2022-08-29 RX ADMIN — Medication 25 MILLIGRAM(S): at 20:05

## 2022-08-29 RX ADMIN — HYDROMORPHONE HYDROCHLORIDE 30 MILLILITER(S): 2 INJECTION INTRAMUSCULAR; INTRAVENOUS; SUBCUTANEOUS at 20:32

## 2022-08-29 NOTE — CONSULT NOTE ADULT - SUBJECTIVE AND OBJECTIVE BOX
ENDOCRINE INITIAL CONSULT -     HPI:      ENDOCRINE HISTORY     PAST MEDICAL & SURGICAL HISTORY:  HTN (hypertension)      Adrenal myelolipoma      Anxiety      History of hepatitis C      Qualitative platelet disorder      Breast cancer  left and right breast - NO IV/BP left arm      Obesity      H/O:       H/O: hysterectomy  pregnancy related - hemorrhage -       Bladder disorder  placenta attached - surgery to remove          FAMILY HISTORY:      Social History:      Home Medications:  lisinopril-hydrochlorothiazide 20 mg-12.5 mg oral tablet: 1 tab(s) orally once a day (16 Aug 2022 11:13)  maxi D3: OTC supplement - 1000 iu dailly  (16 Aug 2022 11:13)  metoprolol succinate 50 mg oral capsule, extended release: 1 cap(s) orally BID (16 Aug 2022 11:13)  sertraline 50 mg oral tablet: 1 tab(s) orally once a day (16 Aug 2022 11:13)      MEDICATIONS  (STANDING):    MEDICATIONS  (PRN):      Allergies    penicillin (Rash)  sulfa drugs (Hives)    Intolerances        REVIEW OF SYSTEMS  Constitutional: No fever  Eyes: No blurry vision  Neuro: No tremors  HEENT: No pain  Cardiovascular: No chest pain, palpitations  Respiratory: No SOB, no cough  GI: No nausea, vomiting, abdominal pain  : No dysuria  Skin: no rash  Psych: no depression  Endocrine: no polyuria, polydipsia  Hem/lymph: no swelling  Osteoporosis: no fractures  ALL OTHER SYSTEMS REVIEWED AND NEGATIVE     UNABLE TO OBTAIN     PHYSICAL EXAM   Vital Signs Last 24 Hrs  T(C): --  T(F): --  HR: --  BP: --  BP(mean): --  RR: --  SpO2: --      GENERAL: NAD, well-groomed, well-developed  EYES: No proptosis, no lid lag, anicteric  HEENT:  Atraumatic, Normocephalic, moist mucous membranes  THYROID: Normal size, no palpable nodules  RESPIRATORY: Clear to auscultation bilaterally; No rales, rhonchi, wheezing  CARDIOVASCULAR: Regular rate and rhythm; No murmurs; no peripheral edema  GI: Soft, nontender, non distended, normal bowel sounds  SKIN: Dry, intact, No rashes or lesions  MUSCULOSKELETAL: Full range of motion, normal strength  NEURO: sensation intact, extraocular movements intact, no tremor  PSYCH: Alert and oriented x 3, normal affect, normal mood  CUSHING'S SIGNS: no striae    CAPILLARY BLOOD GLUCOSE                              LIPIDS    RADIOLOGY ENDOCRINE INITIAL CONSULT - Adrenal Myelolipoma s/p IR embolization     HPI per PST.  67 yr old female scheduled for Adrenal Myelolipoma Embolization with Dr Bajwa tentatively 22 - pt has hx of adrenal myelolipoma followed since  and noted to have increased in size recently - pt hx qualitative platelet disorder with hemorrhage requiring 26u PC  following surgery - pt seen by Hematology who will discuss preop instructions for DDAVP infusion with surgeon - pt hx Hep C treated yrs ago and Breast ca with b/l lumpectomies and RT - No IV/BP left arm - pt followed by Endo - ( Dr Castillo-Mauricio is daughter in law)   Pt given information for COVID PCR testing sites and told to make appointment 3-5 days preop    ENDOCRINE HISTORY   Patient was incidentally found to have a L. adrenal mass ~ , has been asymptomatic. Hormonal hypersecretion work up was done & negative in the past.  She has been following with endocrinology, Dr. Castillo.  L adrenal mass was being monitored with routine CT A/P imaging, imaging consistent with benign adrenal myelolipoma (unable to tolerate MRI) & had previously been stable in size. On recent CT A/P done in recent increase in size to 6.3-6.5cm from prior 5.6cm. Patient was evaluated by urology & IR and presented earlier today for IR guided embolization. Seen post-op in IR recovery suite, no post-op complications & patient doing well per discussion with spouse at bedside. Patient is seen sleeping, had received some pain medication earlier. No post op bleeding complications.     Patient also has a hx of osteopenia, on vitamin D3 & dietary calcium supplementation, and HTN, on Lisinopril-HCTZ 20-12.5mg daily & Metoprolol succinate 25mg daily.     PAST MEDICAL & SURGICAL HISTORY:  HTN (hypertension)      Adrenal myelolipoma      Anxiety      History of hepatitis C      Qualitative platelet disorder      Breast cancer  left and right breast - NO IV/BP left arm      Obesity      H/O:       H/O: hysterectomy  pregnancy related - hemorrhage -       Bladder disorder  placenta attached - surgery to remove          FAMILY HISTORY:      Social History:      Home Medications:  lisinopril-hydrochlorothiazide 20 mg-12.5 mg oral tablet: 1 tab(s) orally once a day (16 Aug 2022 11:13)  maxi D3: OTC supplement - 1000 iu dailly  (16 Aug 2022 11:13)  metoprolol succinate 50 mg oral capsule, extended release: 1 cap(s) orally BID (16 Aug 2022 11:13)  sertraline 50 mg oral tablet: 1 tab(s) orally once a day (16 Aug 2022 11:13)      MEDICATIONS  (STANDING):    MEDICATIONS  (PRN):      Allergies    penicillin (Rash)  sulfa drugs (Hives)    Intolerances        REVIEW OF SYSTEMS  Constitutional: No fever  Eyes: No blurry vision  Neuro: No tremors  HEENT: No pain  Cardiovascular: No chest pain, palpitations  Respiratory: No SOB, no cough  GI: No nausea, vomiting, abdominal pain  : No dysuria  Skin: no rash  Psych: no depression  Endocrine: no polyuria, polydipsia  Hem/lymph: no swelling  Osteoporosis: no fractures  ALL OTHER SYSTEMS REVIEWED AND NEGATIVE     UNABLE TO OBTAIN     PHYSICAL EXAM   Vital Signs Last 24 Hrs  T(C): --  T(F): --  HR: --  BP: --  BP(mean): --  RR: --  SpO2: --      GENERAL: NAD, well-groomed, well-developed  EYES: No proptosis, no lid lag, anicteric  HEENT:  Atraumatic, Normocephalic, moist mucous membranes  THYROID: Normal size, no palpable nodules  RESPIRATORY: Clear to auscultation bilaterally; No rales, rhonchi, wheezing  CARDIOVASCULAR: Regular rate and rhythm; No murmurs; no peripheral edema  GI: Soft, nontender, non distended, normal bowel sounds  SKIN: Dry, intact, No rashes or lesions  MUSCULOSKELETAL: Full range of motion, normal strength  NEURO: sensation intact, extraocular movements intact, no tremor  PSYCH: Alert and oriented x 3, normal affect, normal mood  CUSHING'S SIGNS: no striae    CAPILLARY BLOOD GLUCOSE                              LIPIDS    RADIOLOGY

## 2022-08-29 NOTE — CONSULT NOTE ADULT - ATTENDING COMMENTS
67F s/p IR embolization of enlarging L adrenal mass 6 cm consistent with angiomyolipoma. Seen pre and post op. Doing well.  Check 8AM cortisol, ACTH, BMP. Trend vitals.  Will follow.    Dot Burkett MD  Division of Endocrinology  Pager: [phone]    If after 6PM or before 9AM, or on weekends/holidays, please call endocrine answering service for assistance ([phone]).  For nonurgent matters email [email or web address] for assistance.

## 2022-08-29 NOTE — PATIENT PROFILE ADULT - FUNCTIONAL ASSESSMENT - BASIC MOBILITY ASSESSMENT TYPE
Patient Education     Sinusitis (Antibiotic Treatment)    The sinuses are air-filled spaces within the bones of the face. They connect to the inside of the nose. Sinusitis is an inflammation of the tissue that lines the sinuses. Sinusitis can occur during a cold. It can also happen due to allergies to pollens and other particles in the air. Sinusitis can cause symptoms of sinus congestion and a feeling of fullness. A sinus infection causes fever, headache, and facial pain. There is often green or yellow fluid draining from the nose or into the back of the throat (post-nasal drip). You have been given antibiotics to treat this condition.   Home care  · Take the full course of antibiotics as instructed. Don't stop taking them, even when you feel better.  · Drink plenty of water, hot tea, and other liquids as directed by the healthcare provider. This may help thin nasal mucus. It also may help your sinuses drain fluids.  · Heat may help soothe painful areas of your face. Use a towel soaked in hot water. Or,  the shower and direct the warm spray onto your face. Using a vaporizer along with a menthol rub at night may also help soothe symptoms.   · An expectorant with guaifenesin may help thin nasal mucus and help your sinuses drain fluids. Talk with your provider or pharmacists before taking an over-the-counter (OTC) medicine if you have any questions about it or its side effects..  · You can use an OTC decongestant, unless a similar medicine was prescribed to you. Nasal sprays work the fastest. Use one that contains phenylephrine or oxymetazoline. First blow your nose gently. Then use the spray. Don't use these medicines more often than directed on the label. If you do, your symptoms may get worse. You may also take pills that contain pseudoephedrine. Don’t use products that combine multiple medicines. This is because side effects may be increased. Read labels. You can also ask the pharmacist for help. (People  with high blood pressure should not use decongestants. They can raise blood pressure.) Talk with your provider or pharmacist if you have any questions about the medicine..  · OTC antihistamines may help if allergies contributed to your sinusitis. Talk with your provider or pharmacist if you have any questions about the medicine..  · Don't use nasal rinses or irrigation during an acute sinus infection, unless your healthcare provider tells you to. Rinsing may spread the infection to other areas in your sinuses.  · Use acetaminophen or ibuprofen to control pain, unless another pain medicine was prescribed to you. If you have chronic liver or kidney disease or ever had a stomach ulcer, talk with your healthcare provider before using these medicines. Never give aspirin to anyone under age 18 who is ill with a fever. It may cause severe liver damage.  · Don't smoke. This can make symptoms worse.    Follow-up care  Follow up with your healthcare provider, or as advised.   When to seek medical advice  Call your healthcare provider if any of these occur:   · Facial pain or headache that gets worse  · Stiff neck  · Unusual drowsiness or confusion  · Swelling of your forehead or eyelids  · Symptoms don't go away in 10 days  · Vision problems, such as blurred or double vision  · Fever of 100.4ºF (38ºC) or higher, or as directed by your healthcare provider  Call 911  Call 911 if any of these occur:   · Seizure  · Trouble breathing  · Feeling dizzy or faint  · Fingernails, skin or lips look blue, purple , or gray  Prevention  Here are steps you can take to help prevent an infection:   · Keep good hand washing habits.  · Don’t have close contact with people who have sore throats, colds, or other upper respiratory infections.  · Don’t smoke, and stay away from secondhand smoke.  · Stay up to date with of your vaccines.  [employer] last reviewed this educational content on 12/1/2019  © 4919-0754 [employer]. All rights  reserved. This information is not intended as a substitute for professional medical care. Always follow your healthcare professional's instructions.            Admission

## 2022-08-29 NOTE — PROCEDURE NOTE - PROCEDURE FINDINGS AND DETAILS
- Angiography demonstrates a dominate arterial supply of myelolipoma arising from the left inferior phrenic artery, which was embolized with 400 micron particles.  - And additional supply was noted to arise from the left renal artery, which was embolized with 400 micron particles and a coil.  - Aortogram demonstrated no other arteries supplying the left adrenal gland or myelolipoma.  - Closure with Mynx device and sterile bandage applied.

## 2022-08-29 NOTE — PROGRESS NOTE ADULT - ASSESSMENT
A/P: 67y Female s/p left AML embo by IR  DVT prophylaxis/OOB  Strict I&O's  Analgesia and antiemetics as needed  Diet  AM labs

## 2022-08-29 NOTE — CONSULT NOTE ADULT - ASSESSMENT
Adrenal Myelolipoma    68yo F w/ hx of qualitative platelet disorder, HTN, Osteopenia, & L. adrenal mass, imaging consistent w/ benign adrenal myelolipoma, admitted for IR Guided Embolization, now post op. Endocrine following for post-op monitoring for hypoadrenalism.     L. Adrenal Mass c/f Benign Adrenal Myelolipoma   hormonal hypersecretion work up previously negative   recent increase in size from 5.6cm to 6.3-6.5cm noted on imaging, otherwise asymptomatic   s/p IR embolization 8/29  Recommendations:   - continue close hemodynamic monitoring during post op period   - check 8AM Cortisol & ACTH level tomorrow morning (ordered)  - monitor BMP/electrolytes for hyperK, hypoNa, hypoglycemia     HTN   patient noted to be mildly htn on bedside monitor  although patient is s/p L. adrenal embolization, low suspicion for post op AI & given right adrenal gland still intact, would recommend resuming home antihypertensive lisinopril 20mg daily if BP remains persistently elevated (would hold off on resuming metoprolol succinate 25mg daily until HR persistently > 60 - noted to be mildly bradycardic to 50s on bedside monitor, although this is likely due to patient sleeping/analgesic.)    Osteopenia   hx of Vitamin D insufficiency   - continue outpatient monitoring of BMD, next DEXA in 10/23  - continue dietary calcium intake, 2-3 servings per day   - continue Vitamin D3 1000 units daily     Discussed w/ Dr. Castillo.     Luz Lewis DO   Endocrine Fellow  For follow up questions, discharge recommendations, or new consults please call answering service at [phone] (weekdays), [phone] (nights/weekends). For nonurgent matters, please email [email or web address] or [email or web address]

## 2022-08-29 NOTE — PROGRESS NOTE ADULT - SUBJECTIVE AND OBJECTIVE BOX
Post op Check    Pt seen and examined c/o itchiness. Pain is controlled. Denies SOB/CP/N/V.     Vital Signs Last 24 Hrs  T(C): 36.6 (29 Aug 2022 18:00), Max: 36.6 (29 Aug 2022 18:00)  T(F): 97.8 (29 Aug 2022 18:00), Max: 97.8 (29 Aug 2022 18:00)  HR: 51 (29 Aug 2022 18:00) (51 - 51)  BP: 130/60 (29 Aug 2022 18:00) (130/60 - 130/60)  BP(mean): --  RR: 14 (29 Aug 2022 18:00) (14 - 14)  SpO2: 94% (29 Aug 2022 18:00) (94% - 94%)    Parameters below as of 29 Aug 2022 18:00  Patient On (Oxygen Delivery Method): room air        I&O's Summary      Physical Exam  Gen: NAD, A&Ox3  Pulm: No respiratory distress, no subcostal retractions  CV: RRR  Abd: Soft, NT, ND  + dressing bandaid in right groin c/d/i  Ext: sensation intact

## 2022-08-30 ENCOUNTER — TRANSCRIPTION ENCOUNTER (OUTPATIENT)
Age: 67
End: 2022-08-30

## 2022-08-30 VITALS
DIASTOLIC BLOOD PRESSURE: 53 MMHG | RESPIRATION RATE: 18 BRPM | HEIGHT: 65 IN | OXYGEN SATURATION: 97 % | WEIGHT: 164.02 LBS | HEART RATE: 55 BPM | SYSTOLIC BLOOD PRESSURE: 127 MMHG | TEMPERATURE: 98 F

## 2022-08-30 DIAGNOSIS — R00.1 BRADYCARDIA, UNSPECIFIED: ICD-10-CM

## 2022-08-30 DIAGNOSIS — F41.9 ANXIETY DISORDER, UNSPECIFIED: ICD-10-CM

## 2022-08-30 DIAGNOSIS — R07.89 OTHER CHEST PAIN: ICD-10-CM

## 2022-08-30 LAB
ACTH SER-ACNC: 12.5 PG/ML — SIGNIFICANT CHANGE UP (ref 7.2–63.3)
ANION GAP SERPL CALC-SCNC: 11 MMOL/L — SIGNIFICANT CHANGE UP (ref 7–14)
BUN SERPL-MCNC: 15 MG/DL — SIGNIFICANT CHANGE UP (ref 7–23)
CALCIUM SERPL-MCNC: 9.1 MG/DL — SIGNIFICANT CHANGE UP (ref 8.4–10.5)
CHLORIDE SERPL-SCNC: 101 MMOL/L — SIGNIFICANT CHANGE UP (ref 98–107)
CO2 SERPL-SCNC: 25 MMOL/L — SIGNIFICANT CHANGE UP (ref 22–31)
CORTIS AM PEAK SERPL-MCNC: 15.7 UG/DL — SIGNIFICANT CHANGE UP (ref 6–18.4)
CREAT SERPL-MCNC: 0.58 MG/DL — SIGNIFICANT CHANGE UP (ref 0.5–1.3)
EGFR: 99 ML/MIN/1.73M2 — SIGNIFICANT CHANGE UP
GLUCOSE SERPL-MCNC: 104 MG/DL — HIGH (ref 70–99)
HCT VFR BLD CALC: 33.1 % — LOW (ref 34.5–45)
HGB BLD-MCNC: 10.5 G/DL — LOW (ref 11.5–15.5)
MCHC RBC-ENTMCNC: 26.8 PG — LOW (ref 27–34)
MCHC RBC-ENTMCNC: 31.7 GM/DL — LOW (ref 32–36)
MCV RBC AUTO: 84.4 FL — SIGNIFICANT CHANGE UP (ref 80–100)
NRBC # BLD: 0 /100 WBCS — SIGNIFICANT CHANGE UP (ref 0–0)
NRBC # FLD: 0 K/UL — SIGNIFICANT CHANGE UP (ref 0–0)
PLATELET # BLD AUTO: 252 K/UL — SIGNIFICANT CHANGE UP (ref 150–400)
POTASSIUM SERPL-MCNC: 3.6 MMOL/L — SIGNIFICANT CHANGE UP (ref 3.5–5.3)
POTASSIUM SERPL-SCNC: 3.6 MMOL/L — SIGNIFICANT CHANGE UP (ref 3.5–5.3)
RBC # BLD: 3.92 M/UL — SIGNIFICANT CHANGE UP (ref 3.8–5.2)
RBC # FLD: 13.6 % — SIGNIFICANT CHANGE UP (ref 10.3–14.5)
SODIUM SERPL-SCNC: 137 MMOL/L — SIGNIFICANT CHANGE UP (ref 135–145)
TROPONIN T, HIGH SENSITIVITY RESULT: 7 NG/L — SIGNIFICANT CHANGE UP
WBC # BLD: 10.44 K/UL — SIGNIFICANT CHANGE UP (ref 3.8–10.5)
WBC # FLD AUTO: 10.44 K/UL — SIGNIFICANT CHANGE UP (ref 3.8–10.5)

## 2022-08-30 PROCEDURE — 99223 1ST HOSP IP/OBS HIGH 75: CPT

## 2022-08-30 PROCEDURE — 93010 ELECTROCARDIOGRAM REPORT: CPT

## 2022-08-30 PROCEDURE — 99231 SBSQ HOSP IP/OBS SF/LOW 25: CPT

## 2022-08-30 PROCEDURE — 99232 SBSQ HOSP IP/OBS MODERATE 35: CPT

## 2022-08-30 RX ORDER — ACETAMINOPHEN 500 MG
3 TABLET ORAL
Qty: 0 | Refills: 0 | DISCHARGE
Start: 2022-08-30

## 2022-08-30 RX ORDER — HYDROCHLOROTHIAZIDE 25 MG
12.5 TABLET ORAL DAILY
Refills: 0 | Status: DISCONTINUED | OUTPATIENT
Start: 2022-08-30 | End: 2022-08-30

## 2022-08-30 RX ORDER — OXYCODONE HYDROCHLORIDE 5 MG/1
5 TABLET ORAL EVERY 4 HOURS
Refills: 0 | Status: DISCONTINUED | OUTPATIENT
Start: 2022-08-30 | End: 2022-08-30

## 2022-08-30 RX ORDER — OXYCODONE HYDROCHLORIDE 5 MG/1
10 TABLET ORAL EVERY 4 HOURS
Refills: 0 | Status: DISCONTINUED | OUTPATIENT
Start: 2022-08-30 | End: 2022-08-30

## 2022-08-30 RX ORDER — SERTRALINE 25 MG/1
50 TABLET, FILM COATED ORAL DAILY
Refills: 0 | Status: DISCONTINUED | OUTPATIENT
Start: 2022-08-30 | End: 2022-08-30

## 2022-08-30 RX ORDER — OXYCODONE HYDROCHLORIDE 5 MG/1
1 TABLET ORAL
Qty: 8 | Refills: 0
Start: 2022-08-30

## 2022-08-30 RX ORDER — ACETAMINOPHEN 500 MG
975 TABLET ORAL EVERY 6 HOURS
Refills: 0 | Status: DISCONTINUED | OUTPATIENT
Start: 2022-08-30 | End: 2022-08-30

## 2022-08-30 RX ORDER — SERTRALINE 25 MG/1
1 TABLET, FILM COATED ORAL
Qty: 0 | Refills: 0 | DISCHARGE

## 2022-08-30 RX ORDER — LISINOPRIL 2.5 MG/1
20 TABLET ORAL DAILY
Refills: 0 | Status: DISCONTINUED | OUTPATIENT
Start: 2022-08-30 | End: 2022-08-30

## 2022-08-30 RX ORDER — METOPROLOL TARTRATE 50 MG
1 TABLET ORAL
Qty: 0 | Refills: 0 | DISCHARGE

## 2022-08-30 RX ORDER — LISINOPRIL/HYDROCHLOROTHIAZIDE 10-12.5 MG
1 TABLET ORAL
Qty: 0 | Refills: 0 | DISCHARGE

## 2022-08-30 RX ADMIN — SERTRALINE 50 MILLIGRAM(S): 25 TABLET, FILM COATED ORAL at 12:29

## 2022-08-30 RX ADMIN — HYDROMORPHONE HYDROCHLORIDE 30 MILLILITER(S): 2 INJECTION INTRAMUSCULAR; INTRAVENOUS; SUBCUTANEOUS at 08:19

## 2022-08-30 RX ADMIN — Medication 1000 UNIT(S): at 11:57

## 2022-08-30 NOTE — CONSULT NOTE ADULT - PROBLEM SELECTOR RECOMMENDATION 3
-she takes toprol 50 mg bid at home for hx SVT, event monitor from 1/11 to 1/14/21 showed frequent runs of SVT, fastest at max rate 152 bopm (11 beats), longest 12.9 sec (avg 112 bpm)  -toprol held this morning for bradycardia HR 50's, pt asymptomatic denies dizziness  -hold toprol for now, pt advised to monitor her HR/BP at home and f/u with her cardiologist at [place]  [facility] d/w her family (, son, and daughter in law Dr. Catsillo) at bedside -she takes toprol 50 mg bid at home for hx SVT, event monitor from 1/11 to 1/14/21 showed frequent runs of SVT, fastest at max rate 152 bpm (11 beats), longest 12.9 sec (avg 112 bpm)  -toprol held this morning for bradycardia HR 50's, pt asymptomatic denies dizziness  -hold toprol for now, pt advised to monitor her HR/BP at home and f/u with her cardiologist at [place]  [facility] d/w her family (, son, and daughter in law Dr. Castillo) at bedside

## 2022-08-30 NOTE — DISCHARGE NOTE PROVIDER - CARE PROVIDER_API CALL
Jeremias Burciaga; CATHERINE)  Urology  [address], [address]  [place] [address]  Phone: [phone]  Fax: [phone]  Follow Up Time:     Juan Bajwa)  Diagnostic Radiology; [facility]  [address] [place] [address]  Phone: [phone]  Fax: [phone]  Follow Up Time:

## 2022-08-30 NOTE — DISCHARGE NOTE NURSING/CASE MANAGEMENT/SOCIAL WORK - PATIENT PORTAL LINK FT
You can access the FollowMyHealth Patient Portal offered by [facility] by registering at the following website: [email or web address]. By joining [facility]’s FollowMyHealth portal, you will also be able to view your health information using other applications (apps) compatible with our system.

## 2022-08-30 NOTE — PROGRESS NOTE ADULT - SUBJECTIVE AND OBJECTIVE BOX
Anesthesia Pain Management Service- Attending Addendum    SUBJECTIVE: Patient's pain control adequate    Therapy:	  [ X] IV PCA	   [ ] Epidural           [ ] s/p Spinal Opoid              [ ] Postpartum infusion	  [ ] Patient controlled regional anesthesia (PCRA)    [ ] prn Analgesics    Allergies    penicillin (Rash)  sulfa drugs (Hives)    Intolerances      MEDICATIONS  (STANDING):  cholecalciferol 1000 Unit(s) Oral daily  hydrochlorothiazide 12.5 milliGRAM(s) Oral daily  lisinopril 20 milliGRAM(s) Oral daily  sertraline 50 milliGRAM(s) Oral daily  sodium chloride 0.9%. 1000 milliLiter(s) (75 mL/Hr) IV Continuous <Continuous>    MEDICATIONS  (PRN):  acetaminophen     Tablet .. 975 milliGRAM(s) Oral every 6 hours PRN Mild Pain (1 - 3)  diphenhydrAMINE 25 milliGRAM(s) Oral every 4 hours PRN Rash and/or Itching  naloxone Injectable 0.1 milliGRAM(s) IV Push every 3 minutes PRN For ANY of the following changes in patient status:  A. RR LESS THAN 10 breaths per minute, B. Oxygen saturation LESS THAN 90%, C. Sedation score of 6  ondansetron Injectable 4 milliGRAM(s) IV Push every 6 hours PRN Nausea  oxyCODONE    IR 5 milliGRAM(s) Oral every 4 hours PRN Moderate Pain (4 - 6)  oxyCODONE    IR 10 milliGRAM(s) Oral every 4 hours PRN Severe Pain (7 - 10)      OBJECTIVE:   [X] No new signs     [ ] Other:    Side Effects:  [X ] None			[ ] Other:      ASSESSMENT/PLAN  -Discontinue current therapy    [ ] Therapy changed to:    [ ] IV PCA       [ ] Epidural     [ X] prn Analgesics     Comments: Pain management per primary team, APS to sign off    Note entered after patient seen

## 2022-08-30 NOTE — CONSULT NOTE ADULT - PROBLEM SELECTOR RECOMMENDATION 4
resume home lisinopril/hctz 20/12.5 mg qd with holding parameters (hold for sbp<110)  hold toprol for now i/s/o bradycardia, outpt f/u Cardiology at [place]

## 2022-08-30 NOTE — PROGRESS NOTE ADULT - ASSESSMENT
68yo F w/ hx of qualitative platelet disorder, HTN, Osteopenia, & L. adrenal mass, imaging consistent w/ benign adrenal myelolipoma, admitted for IR Guided Embolization, now post op. Endocrine following for post-op monitoring for hypoadrenalism.     L. Adrenal Mass c/f Benign Adrenal Myelolipoma   hormonal hypersecretion work up previously negative   recent increase in size from 5.6cm to 6.3-6.5cm noted on imaging, otherwise asymptomatic   s/p IR embolization 8/29  -AM cortisol 15.7 within normal limits, no indication of hormonal deficiency post procedure.    HTN   Resumed on Lisinopril and HCTZ home doses not received yet  metoprolol held for bradycardia. History of SVT. Will need to follow up with outpatient cardiologist ([place]) to discuss need for ongoing beta blocker.    Osteopenia   hx of Vitamin D insufficiency   - continue outpatient monitoring of BMD, next DEXA in 10/23  - continue dietary calcium intake, 2-3 servings per day   - continue Vitamin D3 1000 units daily     Ok for discharge from endocrine standpoint.    Dot Burkett MD  Division of Endocrinology  Pager: [phone]    If after 6PM or before 9AM, or on weekends/holidays, please call endocrine answering service for assistance ([phone]).  For nonurgent matters email [email or web address] for assistance.

## 2022-08-30 NOTE — CONSULT NOTE ADULT - SUBJECTIVE AND OBJECTIVE BOX
Luda Ott MD  Pager [phone]    HPI:  67 yr old female with h/o HTN, osteopenia, breast cancer 2009 s/p b/l lumpectomy/RT, hep c s/p interferon treatment, qualitative platelet disorder requiring DDAVP pre-procedure, left adrenal myelolipoma followed since  and noted to have increased in size recently, admitted for IR embolization of adrenal angiomyolipoma on . Patient observed overnight. This morning, she c/o vague substernal chest discomfort, denies associated dyspnea or radiation. Currently resolved, no recurrence with ambulation. Denies h/o cardiac stent/CAD/MI, last stress test in 2016 negative for ischemia. She does have ho SVT for which she is on toprol 50 mg bid per cardiologist at [place]. She also has ho pericardial effusion in  that-was treated with prednisone and NSAIDs for about a year.         PAST MEDICAL & SURGICAL HISTORY:  HTN (hypertension)      Adrenal myelolipoma      Anxiety      History of hepatitis C      Qualitative platelet disorder      Breast cancer  left and right breast - NO IV/BP left arm      Obesity      H/O:       H/O: hysterectomy  pregnancy related - hemorrhage -       Bladder disorder  placenta attached - surgery to remove          Review of Systems:   CONSTITUTIONAL: No fever, weight loss, or fatigue  EYES: No eye pain, visual disturbances, or discharge  ENMT:  No difficulty hearing, tinnitus, vertigo; No sinus or throat pain  NECK: No pain or stiffness  BREASTS: No pain, masses, or nipple discharge  RESPIRATORY: No cough, wheezing, chills or hemoptysis; No shortness of breath  CARDIOVASCULAR: No chest pain, palpitations, dizziness, or leg swelling  GASTROINTESTINAL: No abdominal or epigastric pain. No nausea, vomiting, or hematemesis; No diarrhea or constipation. No melena or hematochezia.  GENITOURINARY: No dysuria, frequency, hematuria, or incontinence  NEUROLOGICAL: No headaches, memory loss, loss of strength, numbness, or tremors  SKIN: No itching, burning, rashes, or lesions   LYMPH NODES: No enlarged glands  ENDOCRINE: No heat or cold intolerance; No hair loss  MUSCULOSKELETAL: No joint pain or swelling; No muscle, back, or extremity pain  PSYCHIATRIC: + anxiety, no depression, mood swings, or difficulty sleeping  HEME/LYMPH: No easy bruising, or bleeding gums  ALLERY AND IMMUNOLOGIC: No hives or eczema    Allergies    penicillin (Rash)  sulfa drugs (Hives)    Intolerances        Social History: denies smoking or alcohol use, lives at home with     FAMILY HISTORY: denies premature heart disease in first degree relatives       Home Medications:  lisinopril-hydrochlorothiazide 20 mg-12.5 mg oral tablet: 1 tab(s) orally once a day (16 Aug 2022 11:13)  maxi D3: OTC supplement - 1000 iu dailly  (16 Aug 2022 11:13)  metoprolol succinate 50 mg oral capsule, extended release: 1 cap(s) orally BID (16 Aug 2022 11:13)  sertraline 50 mg oral tablet: 1 tab(s) orally once a day (16 Aug 2022 11:13)      MEDICATIONS  (STANDING):  cholecalciferol 1000 Unit(s) Oral daily  hydrochlorothiazide 12.5 milliGRAM(s) Oral daily  lisinopril 20 milliGRAM(s) Oral daily  sertraline 50 milliGRAM(s) Oral daily  sodium chloride 0.9%. 1000 milliLiter(s) (75 mL/Hr) IV Continuous <Continuous>    MEDICATIONS  (PRN):  acetaminophen     Tablet .. 975 milliGRAM(s) Oral every 6 hours PRN Mild Pain (1 - 3)  diphenhydrAMINE 25 milliGRAM(s) Oral every 4 hours PRN Rash and/or Itching  naloxone Injectable 0.1 milliGRAM(s) IV Push every 3 minutes PRN For ANY of the following changes in patient status:  A. RR LESS THAN 10 breaths per minute, B. Oxygen saturation LESS THAN 90%, C. Sedation score of 6  ondansetron Injectable 4 milliGRAM(s) IV Push every 6 hours PRN Nausea  oxyCODONE    IR 5 milliGRAM(s) Oral every 4 hours PRN Moderate Pain (4 - 6)  oxyCODONE    IR 10 milliGRAM(s) Oral every 4 hours PRN Severe Pain (7 - 10)      Vital Signs Last 24 Hrs  T(C): 36.7 (30 Aug 2022 10:03), Max: 36.7 (30 Aug 2022 05:35)  T(F): 98 (30 Aug 2022 10:03), Max: 98.1 (30 Aug 2022 05:35)  HR: 55 (30 Aug 2022 10:03) (51 - 57)  BP: 127/53 (30 Aug 2022 10:03) (118/63 - 147/65)  BP(mean): --  RR: 18 (30 Aug 2022 10:03) (14 - 18)  SpO2: 97% (30 Aug 2022 10:03) (94% - 97%)    Parameters below as of 30 Aug 2022 10:03  Patient On (Oxygen Delivery Method): room air      CAPILLARY BLOOD GLUCOSE        I&O's Summary      PHYSICAL EXAM:  GENERAL: NAD, well-developed  HEAD:  Atraumatic, Normocephalic  EYES: EOMI, PERRLA, conjunctiva and sclera clear  NECK: Supple, No JVD  CHEST/LUNG: Clear to auscultation bilaterally; No wheeze  HEART: Regular rate and rhythm; No murmurs, rubs, or gallops  ABDOMEN: Soft, Nontender, Nondistended; Bowel sounds present  EXTREMITIES:  2+ Peripheral Pulses, No clubbing, cyanosis, or edema  PSYCH: AAOx3  NEUROLOGY: non-focal  SKIN: No rashes or lesions, wound clean    LABS:                        10.5   10.44 )-----------( 252      ( 30 Aug 2022 07:45 )             33.1     08-30    137  |  101  |  15  ----------------------------<  104<H>  3.6   |  25  |  0.58    Ca    9.1      30 Aug 2022 07:45      Microbiology     RADIOLOGY & ADDITIONAL TESTS:    Imaging Personally Reviewed:    Consultant(s) Notes Reviewed:      Care Discussed with Consultants/Other Providers:   Luda Ott MD  Pager [phone]    HPI:  67 yr old female with h/o HTN, osteopenia, breast cancer 2009 s/p b/l lumpectomy/RT, hep c s/p interferon treatment, qualitative platelet disorder requiring DDAVP pre-procedure, left adrenal myelolipoma followed since  and noted to have increased in size recently, admitted for IR embolization of adrenal angiomyolipoma on . Patient observed overnight. This morning, she c/o vague substernal chest discomfort, denies associated dyspnea or radiation. Currently resolved, no recurrence with ambulation. Denies h/o cardiac stent/CAD/MI, last stress test in 2016 negative for ischemia. She does have ho SVT for which she is on toprol 50 mg bid per cardiologist at [place]. She also has ho pericardial effusion in  that-was treated with prednisone and NSAIDs for about a year.         PAST MEDICAL & SURGICAL HISTORY:  HTN (hypertension)      Adrenal myelolipoma      Anxiety      History of hepatitis C      Qualitative platelet disorder      Breast cancer  left and right breast - NO IV/BP left arm      Obesity      H/O:       H/O: hysterectomy  pregnancy related - hemorrhage -       Bladder disorder  placenta attached - surgery to remove          Review of Systems:   CONSTITUTIONAL: No fever, weight loss, or fatigue  EYES: No eye pain, visual disturbances, or discharge  ENMT:  No difficulty hearing, tinnitus, vertigo; No sinus or throat pain  NECK: No pain or stiffness  BREASTS: No pain, masses, or nipple discharge  RESPIRATORY: No cough, wheezing, chills or hemoptysis; No shortness of breath  CARDIOVASCULAR: No chest pain, palpitations, dizziness, or leg swelling  GASTROINTESTINAL: No abdominal or epigastric pain. No nausea, vomiting, or hematemesis; No diarrhea or constipation. No melena or hematochezia.  GENITOURINARY: No dysuria, frequency, hematuria, or incontinence  NEUROLOGICAL: No headaches, memory loss, loss of strength, numbness, or tremors  SKIN: No itching, burning, rashes, or lesions   LYMPH NODES: No enlarged glands  ENDOCRINE: No heat or cold intolerance; No hair loss  MUSCULOSKELETAL: No joint pain or swelling; No muscle, back, or extremity pain  PSYCHIATRIC: + anxiety, no depression, mood swings, or difficulty sleeping  HEME/LYMPH: No easy bruising, or bleeding gums  ALLERY AND IMMUNOLOGIC: No hives or eczema    Allergies    penicillin (Rash)  sulfa drugs (Hives)    Intolerances        Social History: denies smoking or alcohol use, lives at home with     FAMILY HISTORY: denies premature heart disease in first degree relatives       Home Medications:  lisinopril-hydrochlorothiazide 20 mg-12.5 mg oral tablet: 1 tab(s) orally once a day (16 Aug 2022 11:13)  maxi D3: OTC supplement - 1000 iu dailly  (16 Aug 2022 11:13)  metoprolol succinate 50 mg oral capsule, extended release: 1 cap(s) orally BID (16 Aug 2022 11:13)  sertraline 50 mg oral tablet: 1 tab(s) orally once a day (16 Aug 2022 11:13)      MEDICATIONS  (STANDING):  cholecalciferol 1000 Unit(s) Oral daily  hydrochlorothiazide 12.5 milliGRAM(s) Oral daily  lisinopril 20 milliGRAM(s) Oral daily  sertraline 50 milliGRAM(s) Oral daily  sodium chloride 0.9%. 1000 milliLiter(s) (75 mL/Hr) IV Continuous <Continuous>    MEDICATIONS  (PRN):  acetaminophen     Tablet .. 975 milliGRAM(s) Oral every 6 hours PRN Mild Pain (1 - 3)  diphenhydrAMINE 25 milliGRAM(s) Oral every 4 hours PRN Rash and/or Itching  naloxone Injectable 0.1 milliGRAM(s) IV Push every 3 minutes PRN For ANY of the following changes in patient status:  A. RR LESS THAN 10 breaths per minute, B. Oxygen saturation LESS THAN 90%, C. Sedation score of 6  ondansetron Injectable 4 milliGRAM(s) IV Push every 6 hours PRN Nausea  oxyCODONE    IR 5 milliGRAM(s) Oral every 4 hours PRN Moderate Pain (4 - 6)  oxyCODONE    IR 10 milliGRAM(s) Oral every 4 hours PRN Severe Pain (7 - 10)      Vital Signs Last 24 Hrs  T(C): 36.7 (30 Aug 2022 10:03), Max: 36.7 (30 Aug 2022 05:35)  T(F): 98 (30 Aug 2022 10:03), Max: 98.1 (30 Aug 2022 05:35)  HR: 55 (30 Aug 2022 10:03) (51 - 57)  BP: 127/53 (30 Aug 2022 10:03) (118/63 - 147/65)  BP(mean): --  RR: 18 (30 Aug 2022 10:03) (14 - 18)  SpO2: 97% (30 Aug 2022 10:03) (94% - 97%)    Parameters below as of 30 Aug 2022 10:03  Patient On (Oxygen Delivery Method): room air      CAPILLARY BLOOD GLUCOSE        I&O's Summary      PHYSICAL EXAM:  GENERAL: NAD, well-developed  HEAD:  Atraumatic, Normocephalic  EYES: EOMI, PERRLA, conjunctiva and sclera clear  NECK: Supple, No JVD  CHEST/LUNG: Clear to auscultation bilaterally; No wheeze  HEART: Regular rate and rhythm; No murmurs, rubs, or gallops  ABDOMEN: Soft, Nontender, Nondistended; Bowel sounds present  EXTREMITIES:  2+ Peripheral Pulses, No clubbing, cyanosis, or edema  PSYCH: AAOx3  NEUROLOGY: non-focal  SKIN: No rashes or lesions, wound clean    LABS:                        10.5   10.44 )-----------( 252      ( 30 Aug 2022 07:45 )             33.1     08-30    137  |  101  |  15  ----------------------------<  104<H>  3.6   |  25  |  0.58    Ca    9.1      30 Aug 2022 07:45      Microbiology     RADIOLOGY & ADDITIONAL TESTS:    Imaging Personally Reviewed:    Consultant(s) Notes Reviewed:      Care Discussed with Consultants/Other Providers: urology AIYANA Duffy, resume home lisinopril/hctz, hold toprol for now, endo Dr. Castillo, outpt f/u with her cardiologist re: beta blocker

## 2022-08-30 NOTE — PROGRESS NOTE ADULT - SUBJECTIVE AND OBJECTIVE BOX
Chief Complaint: S/p L adrenal mass embolization    History: ambulating, tolerating po  had some chest/back pressure before in bed resolved, able to ambulate without CP or SOB.    MEDICATIONS  (STANDING):  cholecalciferol 1000 Unit(s) Oral daily  hydrochlorothiazide 12.5 milliGRAM(s) Oral daily  lisinopril 20 milliGRAM(s) Oral daily  sertraline 50 milliGRAM(s) Oral daily  sodium chloride 0.9%. 1000 milliLiter(s) (75 mL/Hr) IV Continuous <Continuous>    MEDICATIONS  (PRN):  acetaminophen     Tablet .. 975 milliGRAM(s) Oral every 6 hours PRN Mild Pain (1 - 3)  diphenhydrAMINE 25 milliGRAM(s) Oral every 4 hours PRN Rash and/or Itching  naloxone Injectable 0.1 milliGRAM(s) IV Push every 3 minutes PRN For ANY of the following changes in patient status:  A. RR LESS THAN 10 breaths per minute, B. Oxygen saturation LESS THAN 90%, C. Sedation score of 6  ondansetron Injectable 4 milliGRAM(s) IV Push every 6 hours PRN Nausea  oxyCODONE    IR 5 milliGRAM(s) Oral every 4 hours PRN Moderate Pain (4 - 6)  oxyCODONE    IR 10 milliGRAM(s) Oral every 4 hours PRN Severe Pain (7 - 10)      Allergies    penicillin (Rash)  sulfa drugs (Hives)    Intolerances      Review of Systems:    ALL OTHER SYSTEMS REVIEWED AND NEGATIVE      PHYSICAL EXAM:  VITALS: T(C): 36.7 (08-30-22 @ 10:03)  T(F): 98 (08-30-22 @ 10:03), Max: 98.1 (08-30-22 @ 05:35)  HR: 55 (08-30-22 @ 10:03) (51 - 57)  BP: 127/53 (08-30-22 @ 10:03) (118/63 - 147/65)  RR:  (14 - 18)  SpO2:  (94% - 97%)  Wt(kg): --  GENERAL: NAD, well-groomed, well-developed  EYES: No proptosis, no lid lag, anicteric  HEENT:  Atraumatic, Normocephalic, moist mucous membranes  RESPIRATORY: nonlabored respirations, no wheezing  PSYCH: Alert and oriented x 3, normal affect, normal mood    CAPILLARY BLOOD GLUCOSE          08-30    137  |  101  |  15  ----------------------------<  104<H>  3.6   |  25  |  0.58    eGFR: 99    Ca    9.1      08-30            Thyroid Function Tests:

## 2022-08-30 NOTE — DISCHARGE NOTE NURSING/CASE MANAGEMENT/SOCIAL WORK - NSDCPNINST_GEN_ALL_CORE
Make a follow up appointment. Call MD if you develop a fever, or if there is redness, swelling, drainage or increased pain at the insertion site.

## 2022-08-30 NOTE — CONSULT NOTE ADULT - PROBLEM SELECTOR RECOMMENDATION 9
-s/p IR embolization of left adrenal myelolipoma on 8/29  -post-procedure management per IR/, pain control, incentive spirometry, DVT ppx  -am cortisol 15.7, no sign of adrenal insufficiency, hemodynamically stable  -dc home today per primary team

## 2022-08-30 NOTE — PROGRESS NOTE ADULT - ATTENDING COMMENTS
s/p  left Adrenal ML embo by IR 8/29. having some chest pain. will obtain EKG and troponin. appreciate IR input

## 2022-08-30 NOTE — DISCHARGE NOTE PROVIDER - CARE PROVIDERS DIRECT ADDRESSES
[email or web address]

## 2022-08-30 NOTE — PROGRESS NOTE ADULT - SUBJECTIVE AND OBJECTIVE BOX
Anesthesia Pain Management Service    SUBJECTIVE: Patient is doing well with IV PCA and no significant problems reported.  Patient states her pain is controlled now, but earlier she had some pain.     Pain Scale Score	At rest: _1/10__ 	With Activity: ___ 	[X ] Refer to charted pain scores    THERAPY:    [ ] IV PCA Morphine		[ ] 5 mg/mL	[ ] 1 mg/mL  [X ] IV PCA Hydromorphone	[ ] 5 mg/mL	[X ] 1 mg/mL  [ ] IV PCA Fentanyl		[ ] 50 micrograms/mL    Demand dose __0.2_ lockout __6_ (minutes) Continuous Rate _0__ Total: _2.10__   mg used (in past 24 hrs)      MEDICATIONS  (STANDING):  cholecalciferol 1000 Unit(s) Oral daily  hydrochlorothiazide 12.5 milliGRAM(s) Oral daily  lisinopril 20 milliGRAM(s) Oral daily  sertraline 50 milliGRAM(s) Oral daily  sodium chloride 0.9%. 1000 milliLiter(s) (75 mL/Hr) IV Continuous <Continuous>    MEDICATIONS  (PRN):  acetaminophen     Tablet .. 975 milliGRAM(s) Oral every 6 hours PRN Mild Pain (1 - 3)  diphenhydrAMINE 25 milliGRAM(s) Oral every 4 hours PRN Rash and/or Itching  naloxone Injectable 0.1 milliGRAM(s) IV Push every 3 minutes PRN For ANY of the following changes in patient status:  A. RR LESS THAN 10 breaths per minute, B. Oxygen saturation LESS THAN 90%, C. Sedation score of 6  ondansetron Injectable 4 milliGRAM(s) IV Push every 6 hours PRN Nausea  oxyCODONE    IR 5 milliGRAM(s) Oral every 4 hours PRN Moderate Pain (4 - 6)  oxyCODONE    IR 10 milliGRAM(s) Oral every 4 hours PRN Severe Pain (7 - 10)      OBJECTIVE:  Patient is sitting up in bed.    Sedation Score:	[ X] Alert	[ ] Drowsy 	[ ] Arousable	[ ] Asleep	[ ] Unresponsive    Side Effects:	[X ] None	[ ] Nausea	[ ] Vomiting	[ ] Pruritus  		[ ] Other:    Vital Signs Last 24 Hrs  T(C): 36.7 (30 Aug 2022 10:03), Max: 36.7 (30 Aug 2022 05:35)  T(F): 98 (30 Aug 2022 10:03), Max: 98.1 (30 Aug 2022 05:35)  HR: 55 (30 Aug 2022 10:03) (51 - 57)  BP: 127/53 (30 Aug 2022 10:03) (118/63 - 147/65)  BP(mean): --  RR: 18 (30 Aug 2022 10:03) (14 - 18)  SpO2: 97% (30 Aug 2022 10:03) (94% - 97%)    Parameters below as of 30 Aug 2022 10:03  Patient On (Oxygen Delivery Method): room air        ASSESSMENT/ PLAN    Therapy to  be:	[ ] Continue   [ X] Discontinued   [X ] Change to prn Analgesics    Documentation and Verification of current medications:   [X] Done	[ ] Not done, not elligible    Comments: IV Dilaudid PCA discontinued. PRN Oral/IV opioids and/or Adjuvant non-opioid medication to be ordered at this point.    Progress Note written now but Patient was seen earlier.

## 2022-08-30 NOTE — DISCHARGE NOTE PROVIDER - NSDCCPCAREPLAN_GEN_ALL_CORE_FT
PRINCIPAL DISCHARGE DIAGNOSIS  Diagnosis: Adrenal myelolipoma  Assessment and Plan of Treatment: No heavy lifting or straining.  Dr. Burciaga's office will call you with a follow up appointment  Call the office if you have fever greater than 101, difficulty urinating, pain not relieved with pain medication, nausea/vomiting.        SECONDARY DISCHARGE DIAGNOSES  Diagnosis: Osteopenia  Assessment and Plan of Treatment: Continue current home medications and follow up with your primary care provider      Diagnosis: Anxiety  Assessment and Plan of Treatment: Continue current home medications and follow up with your primary care provider      Diagnosis: HTN (hypertension)  Assessment and Plan of Treatment: Do not restart your metoprolol, your heart rate was low.  Monitor your heart rate and follow up with your cardiologist at [place] next week.

## 2022-08-30 NOTE — CONSULT NOTE ADULT - PROBLEM SELECTOR RECOMMENDATION 2
-atypical chest pain, no hx of CAD  -EKG reviewed, NSR, no significant ST/T changes  troponin negative 7  -resolved, cont to monitor  -pt advised to f/u with her cardiologist at [place]  last echo from 7/15/22 normal LVEF 55-60%, mild LAE, mild MR  last stress test from Nov 2016 no myocardial ischemia

## 2022-08-30 NOTE — PROGRESS NOTE ADULT - ASSESSMENT
Status post embolization of left adrenal myelolipoma. Admitted overnight for pain control. No evidence of groin hematoma.    Plan:  - Ok to discharge today from IR perspective.    John Clancy MD  PGY-V, Interventional Radiology    For EMERGENT inquiries/questions:  [facility]-p.[phone]  [facility]-p.[phone] ([phone])    Available on [employer] TEAMS for all non-urgent questions  Non-emergent consults: Please place a sunrise order "Consult-Interventional Radiology" with an appropriate callback number.    For questions about scheduling during appropriate work hours, call IR :  [facility]: [phone]  [facility]: [phone]    For outpatient IR booking:  [facility]: [phone]  [facility]: [phone] Status post embolization of left adrenal myelolipoma. Admitted overnight for pain control. No evidence of groin hematoma.    Plan:  - PCA can be removed. PO medication prn for pain control.  - OK to discharge today from IR perspective pending official EKG read.  - Patient can follow up with Dr. Bajwa as an outpatient ([facility]: [phone])    John Clancy MD  PGY-V, Interventional Radiology    For EMERGENT inquiries/questions:  [facility]-p.[phone]  [facility]-p.[phone] ([phone])    Available on Microsoft TEAMS for all non-urgent questions  Non-emergent consults: Please place a sunrise order "Consult-Interventional Radiology" with an appropriate callback number.    For questions about scheduling during appropriate work hours, call IR :  [facility]: [phone]  [facility]: [phone]    For outpatient IR booking:  [facility]: [phone]  [facility]: [phone]

## 2022-08-30 NOTE — DISCHARGE NOTE PROVIDER - HOSPITAL COURSE
68 yo F underwent IR embolization of left adrenal AML on 8/29/2022.  Pain was initially controlled w/ PCA and then transitioned with good effect to oral.  Pt had some back/chest discomfort on postprocedure day #1, EKG unchanged and troponin normal.  Metoprolol held given asymptomatic HR of 51-57.  Pt d/c to f/u with Dr. Burciaga, Dr. Bajwa and her cardiologist at [place].  i-stop checked.

## 2022-08-30 NOTE — PROGRESS NOTE ADULT - SUBJECTIVE AND OBJECTIVE BOX
Subjective  pt seen and examined.  c/o pain overnight, using PCA x4    Objective    Vital signs  T(F): , Max: 98.1 (08-30-22 @ 05:35)  HR: 51 (08-30-22 @ 05:35)  BP: 131/62 (08-30-22 @ 05:35)  SpO2: 96% (08-30-22 @ 05:35)  Wt(kg): --    Output       Gen: NAD  Abd: SNtNd,   : no adler    Labs        Urine Cx: ?  Blood Cx: ?    Imaging

## 2022-08-30 NOTE — CONSULT NOTE ADULT - ASSESSMENT
67F with h/o HTN, osteopenia, breast cancer 2009 s/p b/l lumpectomy/RT, hep c s/p interferon treatment, SVT on beta blocker, pericardial effusion/pericarditis in 2013 s/p NSAIDs/steroids, qualitative platelet disorder requiring DDAVP pre-procedure, left adrenal myelolipoma followed since 2009 and noted to have increased in size recently, s/p IR embolization of adrenal angiomyolipoma on 8/29.

## 2022-08-30 NOTE — DISCHARGE NOTE PROVIDER - NSDCMRMEDTOKEN_GEN_ALL_CORE_FT
acetaminophen 325 mg oral tablet: 3 tablets every 6 hours as needed for mild to moderate pain  lisinopril-hydrochlorothiazide 20 mg-12.5 mg oral tablet: 1 tab(s) orally once a day  maxi D3: OTC supplement - 1000 iu dailly   sertraline 50 mg oral tablet: 1 tab(s) orally once a day   acetaminophen 325 mg oral tablet: 3 tablets every 6 hours as needed for mild to moderate pain  lisinopril-hydrochlorothiazide 20 mg-12.5 mg oral tablet: 1 tab(s) orally once a day  maxi D3: OTC supplement - 1000 iu dailly   oxyCODONE 5 mg oral tablet: 1 tablet every 4 hours as needed for severe pain MDD:6  sertraline 50 mg oral tablet: 1 tab(s) orally once a day

## 2022-08-30 NOTE — PROGRESS NOTE ADULT - ASSESSMENT
67y Female s/p left Adrenal ML s/p embo by IR 8/29    Plan:  -cbc/bmp  -oob/ambi  -reg diet  -f/u endocrine  -f/u IR  -f/u pain Re PCA,   -possible d/c today pending IR/endo/pain clearance

## 2022-08-30 NOTE — PROGRESS NOTE ADULT - SUBJECTIVE AND OBJECTIVE BOX
Reason for Consult:   History of Present Illness:   67y Female with left adrenal myelolipoma. Embolized on 8/29 by IR. Admitted overnight for pain control. Patient noted to have back and chest pain overnight. Negative troponins. Pain likely secondary to embolization.    Patient seen at bedside. Mild to moderate back pain. Right groin is soft without evidence of hematoma.    Pertinent PMH/PSH:   HTN (hypertension)  Adrenal myelolipoma  Anxiety  History of hepatitis C  Qualitative platelet disorder    Allergies:   penicillin (Rash)  sulfa drugs (Hives)    Medications:   metoprolol tartrate  diphenhydrAMINE  cholecalciferol  ondansetron Injectable  naloxone Injectable  HYDROmorphone PCA (1 mG/mL) Rescue Clinician Bolus    Vital Signs:  T(C): 36.7 (08-30-22 @ 05:35), Max: 36.7 (08-30-22 @ 05:35)  HR: 57 (08-30-22 @ 07:55) (51 - 57)  BP: 147/65 (08-30-22 @ 07:55) (118/63 - 147/65)  RR: 18 (08-30-22 @ 07:55) (14 - 18)  SpO2: 96% (08-30-22 @ 07:55) (94% - 96%)    Relevant Lab Results:            10.5  10.44)-----(252     (08-30-22 @ 07:45)         33.1     137 | 101 | 15  --------------------< 104     (08-30-22 @ 07:45)  3.6 | 25 | 0.58     Reason for Consult:   History of Present Illness:   67y Female with left adrenal myelolipoma. Embolized on 8/29 by IR. Admitted overnight for pain control. Patient noted to have back and chest pain overnight. Negative troponins, awaiting official EKG read. Pain likely secondary to embolization.    Patient seen at bedside. Mild to moderate back pain. Right groin is soft without evidence of hematoma.    Pertinent PMH/PSH:   HTN (hypertension)  Adrenal myelolipoma  Anxiety  History of hepatitis C  Qualitative platelet disorder    Allergies:   penicillin (Rash)  sulfa drugs (Hives)    Medications:   metoprolol tartrate  diphenhydrAMINE  cholecalciferol  ondansetron Injectable  naloxone Injectable  HYDROmorphone PCA (1 mG/mL) Rescue Clinician Bolus    Vital Signs:  T(C): 36.7 (08-30-22 @ 05:35), Max: 36.7 (08-30-22 @ 05:35)  HR: 57 (08-30-22 @ 07:55) (51 - 57)  BP: 147/65 (08-30-22 @ 07:55) (118/63 - 147/65)  RR: 18 (08-30-22 @ 07:55) (14 - 18)  SpO2: 96% (08-30-22 @ 07:55) (94% - 96%)    Relevant Lab Results:            10.5  10.44)-----(252     (08-30-22 @ 07:45)         33.1     137 | 101 | 15  --------------------< 104     (08-30-22 @ 07:45)  3.6 | 25 | 0.58

## 2022-10-12 ENCOUNTER — APPOINTMENT (OUTPATIENT)
Dept: INTERVENTIONAL RADIOLOGY/VASCULAR | Facility: CLINIC | Age: 67
End: 2022-10-12

## 2022-11-02 ENCOUNTER — APPOINTMENT (OUTPATIENT)
Dept: INTERVENTIONAL RADIOLOGY/VASCULAR | Facility: CLINIC | Age: 67
End: 2022-11-02

## 2022-11-02 VITALS
BODY MASS INDEX: 26.16 KG/M2 | HEART RATE: 51 BPM | SYSTOLIC BLOOD PRESSURE: 119 MMHG | WEIGHT: 157 LBS | HEIGHT: 65 IN | OXYGEN SATURATION: 98 % | DIASTOLIC BLOOD PRESSURE: 72 MMHG

## 2022-11-02 PROCEDURE — 99212 OFFICE O/P EST SF 10 MIN: CPT

## 2022-11-02 NOTE — PHYSICAL EXAM
[No Change] : no change [No] : no [A & O x 3] : alert and oriented to person, place, and time [Normal S1 S2] : normal S1 and S2 [Even/ Unlabored] : even and unlabored [Soft] : soft [Nontender] : nontender [Nondistended] : nondistended [Unchanged from prior visit] : unchanged from prior visit [Good] : good [Right] : right [No Erythema] : no erythema [No Swelling] : no swelling [No Eccymosis] : no eccymosis [No Hematoma] : no hematoma [Fever] : no fever [FreeTextEntry1] : s/p left adrenal artery embo on 08/29/22

## 2022-11-02 NOTE — HISTORY OF PRESENT ILLNESS
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 - [place] [phone]). \par Left Adrenal myelolipoma (Dx 2009) which has increased in size since last CT scan (doesn't tolerate MRI). \par \par Patient is now being referred by Dr. Burciaga for consultation to discuss embolization of left adrenal myelolipoma.\par \par \par Denies any recent SOB, CP, fever, chills, n/v/d. \par \par Patient sates she has been feeling well overall. Appetite has been good no unintentional weight loss.\par \par Patient aware to get hematology clearance prior to procedure with recommendation for DDAVP infusion. \par \par Patient to be evaluated by endocrinologist as well prior to embolization. \par \par INTERVAL HISTORY 11/2/22\par \par Patient is now s/p left adrenal artery embo on 08/29/22. Patient states she has been doing well overall since procedure. No concerns at this time\par \par Denies any recent SOB, CP, fever, chills, n/v/d \par \par

## 2022-11-02 NOTE — ASSESSMENT
[FreeTextEntry1] : Follow-up appointment.\par Status post left adrenal myolipoma embolization on 8/29/2022.\par \par Patient is feeling well, denies pain fever.\par \par Follow-up CT dated 10/20/2022 demonstrated significantly diminished vascularity within the decreased in size left adrenal lesion currently measuring 5.6 x 5.1 x 4.5 cm and previously measuring 6.5 x 5.5 x 5.0 cm.  Small area of increased density l may represent combination of postprocedure hemorrhagic changes and/or residual viable lesion.\par \par Assessment:\par Status post successful left adrenal myolipoma embolization with what appears to be an appropriate result.\par No postprocedure complications.\par \par Plan:\par 1.  Follow-up CT of the abdomen with IV contrast in 1 year.\par 2.  Follow-up office visit after the CT in 1 year.

## 2022-11-02 NOTE — CONSULT LETTER
[Dear  ___] : Dear  [unfilled], [Courtesy Letter:] : I had the pleasure of seeing your patient, [unfilled], in my office today. [Please see my note below.] : Please see my note below. [Consult Closing:] : Thank you very much for allowing me to participate in the care of this patient.  If you have any questions, please do not hesitate to contact me. [Sincerely,] : Sincerely, [FreeTextEntry2] : Dr. Jeremias Burciaga

## 2023-01-20 NOTE — DISCHARGE NOTE PROVIDER - NSRESEARCHGRANT_HIDDEN_GEN_A_CORE
Recent PHQ 2/9 Score    PHQ 2:  Date Adult PHQ 2 Score Adult PHQ 2 Interpretation   1/20/2023 3 Further screening needed       PHQ 9:  Date Adult PHQ 9 Score Adult PHQ 9 Interpretation   1/20/2023 5 Mild Depression     PHQ-2/9 Depression Screening  Little interest or pleasure in activity?: More than half the days  Feeling down, depressed or hopeless?: Several days  Initial depression screening score:: 3  PHQ2 Interpretation: Further screening needed  Trouble falling or staying asleep or sleeping all the time?: Not at all  Feeling tired or having little energy?: Several days  Poor appetite or overeating?: Not at all  Feeling bad about yourself or that you are a failure or have let yourself or family down?: Not at all  Trouble concentrating on things such as reading the newspaper or watching TV?: Not at all  Moving or speaking slowly that other people have noticed or the opposite - being so fidgety or restless that you have been moving around a lot more than usual?: Several days  Thoughts that you would be better off dead or of hurting yourself in some way?: Not at all  Total depressive symptoms score (PHQ9): : 5  PHQ9 Interpretation: Mild Depression  If you reported any problems, how difficult have these problems made it to do your work, take care of things at home, or get along with other people?: Somewhat difficult    Yes

## 2023-06-21 ENCOUNTER — APPOINTMENT (OUTPATIENT)
Dept: ENDOCRINOLOGY | Facility: CLINIC | Age: 68
End: 2023-06-21
Payer: MEDICARE

## 2023-06-21 DIAGNOSIS — Z00.00 ENCOUNTER FOR GENERAL ADULT MEDICAL EXAMINATION W/OUT ABNORMAL FINDINGS: ICD-10-CM

## 2023-06-21 DIAGNOSIS — D35.02 BENIGN NEOPLASM OF LEFT ADRENAL GLAND: ICD-10-CM

## 2023-06-21 PROCEDURE — 99214 OFFICE O/P EST MOD 30 MIN: CPT | Mod: 95

## 2023-06-21 NOTE — HISTORY OF PRESENT ILLNESS
[FreeTextEntry1] : 68F presents for telehealth follow up regarding L adrenal mass, osteopenia, HTN.\par Had CT adrenal protocol June 2020 (unable to tolerate MRI)\par L adrenal fat based mass consistent with benign myelolipoma up to 6cm.\par S/p L adrenal artery embolization 8/29/22 with Dr. Bajwa for left adrenal myelolipoma.\par Doing well since that time.\par Planning for one year follow up with CT with Dr. Bajwa.\par No weight loss, nausea vomiting or any new symptoms to report, feeling well and at baseline.\par \par History of osteopenia on DXA 10/2019\par \par Follow up DXA done 10/21:\par L Spine T score - 1.2\par Fem neck -1.9\par Hip -1.4\par \par Calcium intake - eats yogurt daily, consumes about 2-3 servings of dairy per day. Taking D3 5000 units daily. No fractures. \par \par HTN no change to meds, not routinely checking BP at home, no recent doctor appointments.

## 2023-06-21 NOTE — ASSESSMENT
[FreeTextEntry1] : 68F presents for follow up regarding 5cm adrenal incidentaloma, osteopenia, HTN.\par \par 1) L adrenal myelolipoma\par s/p L adrenal artery embolization 8/29/2022\par Prior workup for adrenal hypersecretion unrevealing\par asymptomatic\par Will include 8AM cortisol, ACTH, DHEAS with labs\par plan to repeat CT per Dr. Bajwa October 2023\par \par 2) HTN - remains on 3  agents. Has not checked BP recently, recommend periodic home monitoring.\par \par 3) Osteopenia - Next DXA due 10/2023\par counselled on 3 servings calcium daily, can include calcium supplement if not taking in enough in diet\par Taking D3 5000 units daily will check 25OHD\par \par 4) B12 deficiency\par not taking supplement\par repeat B12 level\par \par Follow up one year, RACHEAL ivan

## 2023-06-21 NOTE — REASON FOR VISIT
[Follow - Up] : a follow-up visit [Adrenal Evaluation/Adrenal Disorder] : adrenal evaluation/adrenal disorder [Home] : at home, [unfilled] , at the time of the visit. [Medical Office: ([facility])___] : at the medical office located in  [Verbal consent obtained from patient] : the patient, [unfilled]

## 2023-08-18 DIAGNOSIS — R53.83 OTHER FATIGUE: ICD-10-CM

## 2023-10-24 ENCOUNTER — NON-APPOINTMENT (OUTPATIENT)
Age: 68
End: 2023-10-24

## 2023-10-30 ENCOUNTER — NON-APPOINTMENT (OUTPATIENT)
Age: 68
End: 2023-10-30

## 2024-06-26 ENCOUNTER — APPOINTMENT (OUTPATIENT)
Dept: ENDOCRINOLOGY | Facility: CLINIC | Age: 69
End: 2024-06-26
Payer: MEDICARE

## 2024-06-26 DIAGNOSIS — R73.09 OTHER ABNORMAL GLUCOSE: ICD-10-CM

## 2024-06-26 DIAGNOSIS — D17.79 BENIGN LIPOMATOUS NEOPLASM OF OTHER SITES: ICD-10-CM

## 2024-06-26 DIAGNOSIS — E78.49 OTHER HYPERLIPIDEMIA: ICD-10-CM

## 2024-06-26 DIAGNOSIS — E53.8 DEFICIENCY OF OTHER SPECIFIED B GROUP VITAMINS: ICD-10-CM

## 2024-06-26 DIAGNOSIS — M85.80 OTHER SPECIFIED DISORDERS OF BONE DENSITY AND STRUCTURE, UNSPECIFIED SITE: ICD-10-CM

## 2024-06-26 DIAGNOSIS — E55.9 VITAMIN D DEFICIENCY, UNSPECIFIED: ICD-10-CM

## 2024-06-26 DIAGNOSIS — I10 ESSENTIAL (PRIMARY) HYPERTENSION: ICD-10-CM

## 2024-06-26 PROCEDURE — 99214 OFFICE O/P EST MOD 30 MIN: CPT

## 2024-06-26 PROCEDURE — G2211 COMPLEX E/M VISIT ADD ON: CPT

## 2024-12-25 PROBLEM — F10.90 ALCOHOL USE: Status: INACTIVE | Noted: 2019-10-16

## 2025-01-24 NOTE — PATIENT PROFILE ADULT - FUNCTIONAL ASSESSMENT - DAILY ACTIVITY 4.
Anesthesia Post-op Note    Patient: Paul Brand  Procedure(s) Performed: CYSTOSCOPY WITH RIGHT URETERAL RETROGRADES (Right: Ureter)  Anesthesia type: MAC    Vitals Value Taken Time   Temp 97.9 01/24/25 1606   Pulse 63 01/24/25 1606   Resp 20 01/24/25 1606   SpO2 98 01/24/25 1606   /61 01/24/25 1606         Patient Location: bedside  Post-op Vital Signs:stable  Level of Consciousness: awake and alert  Respiratory Status: spontaneous ventilation  Cardiovascular stable  Hydration: euvolemic  Pain Management: adequately controlled  Handoff: Handoff to receiving clinician was performed and questions were answered  Vomiting: none  Nausea: None  Airway Patency:patent  Post-op Assessment: awake, alert, appropriately conversant, or baseline, no complications and patient tolerated procedure well      There were no known notable events for this encounter.                       4 = No assist / stand by assistance

## 2025-06-04 ENCOUNTER — APPOINTMENT (OUTPATIENT)
Dept: ENDOCRINOLOGY | Facility: CLINIC | Age: 70
End: 2025-06-04